# Patient Record
Sex: MALE | Race: WHITE | Employment: OTHER | URBAN - NONMETROPOLITAN AREA
[De-identification: names, ages, dates, MRNs, and addresses within clinical notes are randomized per-mention and may not be internally consistent; named-entity substitution may affect disease eponyms.]

---

## 2017-10-25 ENCOUNTER — HOSPITAL ENCOUNTER (INPATIENT)
Age: 72
LOS: 8 days | Discharge: NURSING, REHAB OR LONG-TERM CARE | DRG: 603 | End: 2017-11-02
Attending: EMERGENCY MEDICINE | Admitting: INTERNAL MEDICINE
Payer: MEDICARE

## 2017-10-25 ENCOUNTER — APPOINTMENT (OUTPATIENT)
Dept: GENERAL RADIOLOGY | Age: 72
DRG: 603 | End: 2017-10-25
Payer: MEDICARE

## 2017-10-25 ENCOUNTER — APPOINTMENT (OUTPATIENT)
Dept: CT IMAGING | Age: 72
DRG: 603 | End: 2017-10-25
Payer: MEDICARE

## 2017-10-25 DIAGNOSIS — L03.116 CELLULITIS OF LEFT LOWER EXTREMITY: Primary | ICD-10-CM

## 2017-10-25 DIAGNOSIS — A41.9 SEPSIS, DUE TO UNSPECIFIED ORGANISM: ICD-10-CM

## 2017-10-25 DIAGNOSIS — S20.20XA CONTUSION OF THORACIC WALL, UNSPECIFIED AREA OF THORACIC WALL, INITIAL ENCOUNTER: ICD-10-CM

## 2017-10-25 DIAGNOSIS — E66.01 MORBID OBESITY (HCC): ICD-10-CM

## 2017-10-25 DIAGNOSIS — E87.6 HYPOKALEMIA: ICD-10-CM

## 2017-10-25 DIAGNOSIS — M79.605 LEFT LEG PAIN: ICD-10-CM

## 2017-10-25 PROBLEM — I89.0 LYMPHEDEMA OF BOTH LOWER EXTREMITIES: Status: ACTIVE | Noted: 2017-10-25

## 2017-10-25 PROBLEM — E83.39 HYPOPHOSPHATEMIA: Status: ACTIVE | Noted: 2017-10-25

## 2017-10-25 PROBLEM — G40.909 SEIZURE DISORDER (HCC): Status: ACTIVE | Noted: 2017-10-25

## 2017-10-25 LAB
ALBUMIN SERPL-MCNC: 2.7 G/DL (ref 3.5–5.2)
ALP BLD-CCNC: 89 U/L (ref 40–130)
ALT SERPL-CCNC: 33 U/L (ref 5–41)
ANION GAP SERPL CALCULATED.3IONS-SCNC: 7 MMOL/L (ref 7–19)
AST SERPL-CCNC: 48 U/L (ref 5–40)
BACTERIA: NEGATIVE /HPF
BASOPHILS ABSOLUTE: 0.1 K/UL (ref 0–0.2)
BASOPHILS RELATIVE PERCENT: 0.4 % (ref 0–1)
BILIRUB SERPL-MCNC: 1.2 MG/DL (ref 0.2–1.2)
BILIRUBIN URINE: ABNORMAL
BLOOD, URINE: NEGATIVE
BUN BLDV-MCNC: 16 MG/DL (ref 8–23)
CALCIUM SERPL-MCNC: 8.9 MG/DL (ref 8.8–10.2)
CHLORIDE BLD-SCNC: 90 MMOL/L (ref 98–111)
CLARITY: ABNORMAL
CO2: 42 MMOL/L (ref 22–29)
COLOR: ABNORMAL
CREAT SERPL-MCNC: 0.8 MG/DL (ref 0.5–1.2)
EKG P AXIS: NORMAL DEGREES
EKG P-R INTERVAL: NORMAL MS
EKG Q-T INTERVAL: 366 MS
EKG QRS DURATION: 92 MS
EKG QTC CALCULATION (BAZETT): 413 MS
EKG T AXIS: 59 DEGREES
EOSINOPHILS ABSOLUTE: 0.3 K/UL (ref 0–0.6)
EOSINOPHILS RELATIVE PERCENT: 1.2 % (ref 0–5)
EPITHELIAL CELLS, UA: 3 /HPF (ref 0–5)
GFR NON-AFRICAN AMERICAN: >60
GLUCOSE BLD-MCNC: 134 MG/DL (ref 74–109)
GLUCOSE URINE: NEGATIVE MG/DL
HCT VFR BLD CALC: 43.7 % (ref 42–52)
HEMOGLOBIN: 14 G/DL (ref 14–18)
HYALINE CASTS: 18 /HPF (ref 0–8)
INR BLD: 1.33 (ref 0.88–1.18)
KETONES, URINE: ABNORMAL MG/DL
LACTIC ACID: 1.8 MMOL/L (ref 0.5–1.9)
LACTIC ACID: 2.3 MMOL/L (ref 0.5–1.9)
LEUKOCYTE ESTERASE, URINE: ABNORMAL
LV EF: 58 %
LVEF MODALITY: NORMAL
LYMPHOCYTES ABSOLUTE: 2.1 K/UL (ref 1.1–4.5)
LYMPHOCYTES RELATIVE PERCENT: 9.9 % (ref 20–40)
MAGNESIUM: 2.1 MG/DL (ref 1.6–2.4)
MCH RBC QN AUTO: 31.3 PG (ref 27–31)
MCHC RBC AUTO-ENTMCNC: 32 G/DL (ref 33–37)
MCV RBC AUTO: 97.8 FL (ref 80–94)
MONOCYTES ABSOLUTE: 1.2 K/UL (ref 0–0.9)
MONOCYTES RELATIVE PERCENT: 5.6 % (ref 0–10)
NEUTROPHILS ABSOLUTE: 17.2 K/UL (ref 1.5–7.5)
NEUTROPHILS RELATIVE PERCENT: 80.7 % (ref 50–65)
NITRITE, URINE: NEGATIVE
PDW BLD-RTO: 14.1 % (ref 11.5–14.5)
PERFORMED ON: NORMAL
PH UA: 6
PHENYTOIN LEVEL: 7.8 UG/ML (ref 10–20)
PHOSPHORUS: 1.7 MG/DL (ref 2.5–4.5)
PLATELET # BLD: 348 K/UL (ref 130–400)
PMV BLD AUTO: 9.6 FL (ref 9.4–12.4)
POC TROPONIN I: 0 NG/ML (ref 0–0.08)
POTASSIUM SERPL-SCNC: 3.1 MMOL/L (ref 3.5–5)
PRO-BNP: 513 PG/ML (ref 0–900)
PROTEIN UA: 30 MG/DL
PROTHROMBIN TIME: 16.5 SEC (ref 12–14.6)
RBC # BLD: 4.47 M/UL (ref 4.7–6.1)
RBC UA: 4 /HPF (ref 0–4)
SODIUM BLD-SCNC: 139 MMOL/L (ref 136–145)
SPECIFIC GRAVITY UA: 1.02
TOTAL CK: 254 U/L (ref 39–308)
TOTAL PROTEIN: 8 G/DL (ref 6.6–8.7)
TSH SERPL DL<=0.05 MIU/L-ACNC: 1.83 UIU/ML (ref 0.27–4.2)
UROBILINOGEN, URINE: 4 E.U./DL
WBC # BLD: 21.2 K/UL (ref 4.8–10.8)
WBC UA: 4 /HPF (ref 0–5)

## 2017-10-25 PROCEDURE — 84100 ASSAY OF PHOSPHORUS: CPT

## 2017-10-25 PROCEDURE — 6370000000 HC RX 637 (ALT 250 FOR IP): Performed by: INTERNAL MEDICINE

## 2017-10-25 PROCEDURE — 6360000002 HC RX W HCPCS: Performed by: INTERNAL MEDICINE

## 2017-10-25 PROCEDURE — 2580000003 HC RX 258: Performed by: INTERNAL MEDICINE

## 2017-10-25 PROCEDURE — 83605 ASSAY OF LACTIC ACID: CPT

## 2017-10-25 PROCEDURE — 36415 COLL VENOUS BLD VENIPUNCTURE: CPT

## 2017-10-25 PROCEDURE — 6360000004 HC RX CONTRAST MEDICATION: Performed by: INTERNAL MEDICINE

## 2017-10-25 PROCEDURE — 84443 ASSAY THYROID STIM HORMONE: CPT

## 2017-10-25 PROCEDURE — 6360000002 HC RX W HCPCS: Performed by: HOSPITALIST

## 2017-10-25 PROCEDURE — 6370000000 HC RX 637 (ALT 250 FOR IP): Performed by: HOSPITALIST

## 2017-10-25 PROCEDURE — 80185 ASSAY OF PHENYTOIN TOTAL: CPT

## 2017-10-25 PROCEDURE — 85610 PROTHROMBIN TIME: CPT

## 2017-10-25 PROCEDURE — 87086 URINE CULTURE/COLONY COUNT: CPT

## 2017-10-25 PROCEDURE — 81001 URINALYSIS AUTO W/SCOPE: CPT

## 2017-10-25 PROCEDURE — 6370000000 HC RX 637 (ALT 250 FOR IP): Performed by: EMERGENCY MEDICINE

## 2017-10-25 PROCEDURE — 73590 X-RAY EXAM OF LOWER LEG: CPT

## 2017-10-25 PROCEDURE — 85025 COMPLETE CBC W/AUTO DIFF WBC: CPT

## 2017-10-25 PROCEDURE — 99285 EMERGENCY DEPT VISIT HI MDM: CPT

## 2017-10-25 PROCEDURE — 87205 SMEAR GRAM STAIN: CPT

## 2017-10-25 PROCEDURE — 6360000002 HC RX W HCPCS: Performed by: EMERGENCY MEDICINE

## 2017-10-25 PROCEDURE — 93971 EXTREMITY STUDY: CPT

## 2017-10-25 PROCEDURE — 82550 ASSAY OF CK (CPK): CPT

## 2017-10-25 PROCEDURE — 83735 ASSAY OF MAGNESIUM: CPT

## 2017-10-25 PROCEDURE — 99285 EMERGENCY DEPT VISIT HI MDM: CPT | Performed by: EMERGENCY MEDICINE

## 2017-10-25 PROCEDURE — 83880 ASSAY OF NATRIURETIC PEPTIDE: CPT

## 2017-10-25 PROCEDURE — 80053 COMPREHEN METABOLIC PANEL: CPT

## 2017-10-25 PROCEDURE — 1210000000 HC MED SURG R&B

## 2017-10-25 PROCEDURE — 2500000003 HC RX 250 WO HCPCS: Performed by: INTERNAL MEDICINE

## 2017-10-25 PROCEDURE — 87040 BLOOD CULTURE FOR BACTERIA: CPT

## 2017-10-25 PROCEDURE — 87070 CULTURE OTHR SPECIMN AEROBIC: CPT

## 2017-10-25 PROCEDURE — 71020 XR CHEST STANDARD TWO VW: CPT

## 2017-10-25 PROCEDURE — C8929 TTE W OR WO FOL WCON,DOPPLER: HCPCS

## 2017-10-25 PROCEDURE — 93005 ELECTROCARDIOGRAM TRACING: CPT

## 2017-10-25 PROCEDURE — 86403 PARTICLE AGGLUT ANTBDY SCRN: CPT

## 2017-10-25 PROCEDURE — 2580000003 HC RX 258: Performed by: EMERGENCY MEDICINE

## 2017-10-25 PROCEDURE — 99223 1ST HOSP IP/OBS HIGH 75: CPT | Performed by: INTERNAL MEDICINE

## 2017-10-25 PROCEDURE — 87185 SC STD ENZYME DETCJ PER NZM: CPT

## 2017-10-25 PROCEDURE — 84484 ASSAY OF TROPONIN QUANT: CPT

## 2017-10-25 RX ORDER — DOCUSATE SODIUM 100 MG/1
100 CAPSULE, LIQUID FILLED ORAL 2 TIMES DAILY
Status: DISCONTINUED | OUTPATIENT
Start: 2017-10-25 | End: 2017-11-02 | Stop reason: HOSPADM

## 2017-10-25 RX ORDER — ACETAMINOPHEN 325 MG/1
650 TABLET ORAL EVERY 4 HOURS PRN
Status: DISCONTINUED | OUTPATIENT
Start: 2017-10-25 | End: 2017-11-02 | Stop reason: HOSPADM

## 2017-10-25 RX ORDER — POTASSIUM CHLORIDE 750 MG/1
10 TABLET, EXTENDED RELEASE ORAL DAILY
COMMUNITY
End: 2018-02-19 | Stop reason: DRUGHIGH

## 2017-10-25 RX ORDER — ONDANSETRON 2 MG/ML
4 INJECTION INTRAMUSCULAR; INTRAVENOUS EVERY 6 HOURS PRN
Status: DISCONTINUED | OUTPATIENT
Start: 2017-10-25 | End: 2017-11-02 | Stop reason: HOSPADM

## 2017-10-25 RX ORDER — HYDROCHLOROTHIAZIDE 50 MG/1
50 TABLET ORAL DAILY
Status: ON HOLD | COMMUNITY
End: 2017-11-02 | Stop reason: HOSPADM

## 2017-10-25 RX ORDER — DIAZEPAM 10 MG/1
10 TABLET ORAL EVERY 12 HOURS PRN
Status: DISCONTINUED | OUTPATIENT
Start: 2017-10-25 | End: 2017-10-25

## 2017-10-25 RX ORDER — DIAZEPAM 10 MG/1
10 TABLET ORAL EVERY 12 HOURS PRN
Status: ON HOLD | COMMUNITY
End: 2017-11-02 | Stop reason: HOSPADM

## 2017-10-25 RX ORDER — POTASSIUM CHLORIDE 20 MEQ/1
40 TABLET, EXTENDED RELEASE ORAL ONCE
Status: COMPLETED | OUTPATIENT
Start: 2017-10-25 | End: 2017-10-25

## 2017-10-25 RX ORDER — MORPHINE SULFATE 4 MG/ML
4 INJECTION, SOLUTION INTRAMUSCULAR; INTRAVENOUS ONCE
Status: COMPLETED | OUTPATIENT
Start: 2017-10-25 | End: 2017-10-25

## 2017-10-25 RX ORDER — METHYLPREDNISOLONE SODIUM SUCCINATE 40 MG/ML
40 INJECTION, POWDER, LYOPHILIZED, FOR SOLUTION INTRAMUSCULAR; INTRAVENOUS ONCE
Status: COMPLETED | OUTPATIENT
Start: 2017-10-25 | End: 2017-10-25

## 2017-10-25 RX ORDER — PHENYTOIN SODIUM 100 MG/1
200 CAPSULE, EXTENDED RELEASE ORAL 3 TIMES DAILY
Status: DISCONTINUED | OUTPATIENT
Start: 2017-10-25 | End: 2017-11-02 | Stop reason: HOSPADM

## 2017-10-25 RX ORDER — SODIUM CHLORIDE 9 MG/ML
INJECTION, SOLUTION INTRAVENOUS CONTINUOUS
Status: DISCONTINUED | OUTPATIENT
Start: 2017-10-25 | End: 2017-10-25

## 2017-10-25 RX ORDER — SODIUM CHLORIDE 0.9 % (FLUSH) 0.9 %
10 SYRINGE (ML) INJECTION PRN
Status: ACTIVE | OUTPATIENT
Start: 2017-10-25 | End: 2017-10-26

## 2017-10-25 RX ORDER — DIAZEPAM 5 MG/1
5 TABLET ORAL EVERY 12 HOURS PRN
Status: DISCONTINUED | OUTPATIENT
Start: 2017-10-25 | End: 2017-11-02 | Stop reason: HOSPADM

## 2017-10-25 RX ORDER — 0.9 % SODIUM CHLORIDE 0.9 %
1000 INTRAVENOUS SOLUTION INTRAVENOUS ONCE
Status: COMPLETED | OUTPATIENT
Start: 2017-10-25 | End: 2017-10-25

## 2017-10-25 RX ORDER — METOPROLOL TARTRATE 50 MG/1
50 TABLET, FILM COATED ORAL 2 TIMES DAILY
Status: DISCONTINUED | OUTPATIENT
Start: 2017-10-25 | End: 2017-10-26

## 2017-10-25 RX ORDER — DABIGATRAN ETEXILATE 150 MG/1
150 CAPSULE, COATED PELLETS ORAL 2 TIMES DAILY
Status: DISCONTINUED | OUTPATIENT
Start: 2017-10-25 | End: 2017-11-02 | Stop reason: HOSPADM

## 2017-10-25 RX ORDER — FENTANYL CITRATE 50 UG/ML
25 INJECTION, SOLUTION INTRAMUSCULAR; INTRAVENOUS EVERY 4 HOURS PRN
Status: COMPLETED | OUTPATIENT
Start: 2017-10-25 | End: 2017-10-27

## 2017-10-25 RX ORDER — BISACODYL 10 MG
10 SUPPOSITORY, RECTAL RECTAL DAILY PRN
Status: DISCONTINUED | OUTPATIENT
Start: 2017-10-25 | End: 2017-11-02 | Stop reason: HOSPADM

## 2017-10-25 RX ADMIN — TAZOBACTAM SODIUM AND PIPERACILLIN SODIUM 3.38 G: 375; 3 INJECTION, SOLUTION INTRAVENOUS at 13:56

## 2017-10-25 RX ADMIN — METOPROLOL TARTRATE 25 MG: 25 TABLET ORAL at 11:19

## 2017-10-25 RX ADMIN — SODIUM CHLORIDE 1000 ML: 9 INJECTION, SOLUTION INTRAVENOUS at 01:59

## 2017-10-25 RX ADMIN — PHENYTOIN SODIUM 200 MG: 100 CAPSULE ORAL at 21:17

## 2017-10-25 RX ADMIN — SODIUM CHLORIDE: 9 INJECTION, SOLUTION INTRAVENOUS at 04:45

## 2017-10-25 RX ADMIN — POTASSIUM PHOSPHATE, MONOBASIC AND POTASSIUM PHOSPHATE, DIBASIC 30 MMOL: 224; 236 INJECTION, SOLUTION, CONCENTRATE INTRAVENOUS at 04:44

## 2017-10-25 RX ADMIN — MORPHINE SULFATE 4 MG: 4 INJECTION, SOLUTION INTRAMUSCULAR; INTRAVENOUS at 02:24

## 2017-10-25 RX ADMIN — DABIGATRAN ETEXILATE MESYLATE 150 MG: 150 CAPSULE ORAL at 21:16

## 2017-10-25 RX ADMIN — TAZOBACTAM SODIUM AND PIPERACILLIN SODIUM 3.38 G: 375; 3 INJECTION, SOLUTION INTRAVENOUS at 06:17

## 2017-10-25 RX ADMIN — METHYLPREDNISOLONE SODIUM SUCCINATE 40 MG: 40 INJECTION, POWDER, FOR SOLUTION INTRAMUSCULAR; INTRAVENOUS at 13:56

## 2017-10-25 RX ADMIN — Medication 10 ML: at 14:30

## 2017-10-25 RX ADMIN — PERFLUTREN 2.64 MG: 6.52 INJECTION, SUSPENSION INTRAVENOUS at 14:30

## 2017-10-25 RX ADMIN — METOPROLOL TARTRATE 50 MG: 50 TABLET ORAL at 21:16

## 2017-10-25 RX ADMIN — Medication 2000 MG: at 02:35

## 2017-10-25 RX ADMIN — TAZOBACTAM SODIUM AND PIPERACILLIN SODIUM 3.38 G: 375; 3 INJECTION, SOLUTION INTRAVENOUS at 18:44

## 2017-10-25 RX ADMIN — CEFEPIME HYDROCHLORIDE 2 G: 2 INJECTION, SOLUTION INTRAVENOUS at 02:00

## 2017-10-25 RX ADMIN — TAZOBACTAM SODIUM AND PIPERACILLIN SODIUM 3.38 G: 375; 3 INJECTION, SOLUTION INTRAVENOUS at 23:56

## 2017-10-25 RX ADMIN — VANCOMYCIN HYDROCHLORIDE 1750 MG: 1 INJECTION, POWDER, LYOPHILIZED, FOR SOLUTION INTRAVENOUS at 15:39

## 2017-10-25 RX ADMIN — ENOXAPARIN SODIUM 40 MG: 40 INJECTION SUBCUTANEOUS at 11:19

## 2017-10-25 RX ADMIN — DOCUSATE SODIUM 100 MG: 100 CAPSULE, LIQUID FILLED ORAL at 11:20

## 2017-10-25 RX ADMIN — DOCUSATE SODIUM 100 MG: 100 CAPSULE, LIQUID FILLED ORAL at 21:16

## 2017-10-25 RX ADMIN — Medication 10 ML: at 21:17

## 2017-10-25 RX ADMIN — POTASSIUM CHLORIDE 40 MEQ: 20 TABLET, EXTENDED RELEASE ORAL at 02:24

## 2017-10-25 ASSESSMENT — ENCOUNTER SYMPTOMS
ROS SKIN COMMENTS: REDNESS LLE
NAUSEA: 0
HEARTBURN: 0
ORTHOPNEA: 0
SHORTNESS OF BREATH: 0
COUGH: 0
DOUBLE VISION: 0
COUGH: 1
BACK PAIN: 0
HEMOPTYSIS: 1
VOMITING: 0
BLURRED VISION: 0
ABDOMINAL PAIN: 0
PHOTOPHOBIA: 0

## 2017-10-25 ASSESSMENT — PAIN SCALES - GENERAL
PAINLEVEL_OUTOF10: 10
PAINLEVEL_OUTOF10: 10

## 2017-10-25 NOTE — PROGRESS NOTES
Nutrition Assessment    Type and Reason for Visit: Initial, Positive Nutrition Screen    Nutrition Recommendations: start Ensure Hi Pro    Malnutrition Assessment:  · Malnutrition Status: No malnutrition  · Context: Acute illness or injury    Nutrition Diagnosis:   · Problem: Increased nutrient needs  · Etiology: related to Increased demand for energy/nutrients due to     Signs and symptoms:  as evidenced by Presence of wounds    Nutrition Assessment:  · Subjective Assessment: +NS for wound, pt has open wound to LLE. Reports appetite is okay, just doesn't eat as much as he used to. · Nutrition-Focused Physical Findings:    · Wound Type: Open Wounds (cellulitis LLE)  · Current Nutrition Therapies:  · Oral Diet Orders: Cardiac   · Oral Diet intake: Unable to assess  · Anthropometric Measures:  · Ht: 5' 11\" (180.3 cm)   · Current Body Wt:    · Admission Body Wt: 455 lb (206.4 kg)  · Usual Body Wt:    · % Weight Change:  ,     · Ideal Body Wt: 172 lb (78 kg), % Ideal Body    · BMI Classification: BMI > or equal to 40.0 Obese Class III    Estimated Intake vs Estimated Needs: Insufficient Data    Nutrition Risk Level: Moderate    Nutrition Interventions:   Continue current diet, Start ONS  Continued Inpatient Monitoring    Nutrition Evaluation:   · Evaluation: Goals set   · Goals: po 75% or more, wound improvement    · Monitoring: Meal Intake, Supplement Intake, Skin Integrity, Wound Healing, Pertinent Labs, Weight    See Adult Nutrition Doc Flowsheet for more detail.      Electronically signed by Luis Fernando Woodall MS, RD, LD on 10/25/17 at 2:58 PM    Contact Number: [phone]

## 2017-10-25 NOTE — ED NOTES
Assessment:    Pt respirations even and unlabored, labored with exertion, skin color pale. Skin is warm and dry. Capillary refill less than 2 seconds. Congested cough noted. Bowel sounds within normal limits. Abdomen soft and nontender. Alert and oriented x 4. Pt is in no acute distress. Severe edema noted to extremities, worse on left. Left leg red from ankle up to lateral thigh. Large oozing wound to left posterior calf and large oozing wound to anterior leg.      Large hematoma noted to left breast.        Hilda Sales RN  10/25/17 [id number]

## 2017-10-25 NOTE — H&P
Hospital Medicine    History & Physical      CC; Pain and redness LLE, sp fall inability to walk    History Obtained From:  patient, electronic medical record    HISTORY OF PRESENT ILLNESS:    The patient is a 81 y.o. male who presents to ed c/o LLE swelling and redness, he did fall at his hotel and could not get up for 2 days, he was found to have cellulitis of entire LLE. He c/o fever, chills , wbc elevated. In er abx started    Past Medical History:        Diagnosis Date    Anxiety     Cerebral hemorrhage [address] [address]     Hypertension        Past Surgical History:        Procedure Laterality Date    TONSILLECTOMY         Medications Prior to Admission:    Prescriptions Prior to Admission: Phenytoin (DILANTIN PO), Take 700 mg by mouth nightly  hydrochlorothiazide (HYDRODIURIL) 50 MG tablet, Take 50 mg by mouth daily  potassium chloride (KLOR-CON M) 10 MEQ extended release tablet, Take 10 mEq by mouth daily  diazepam (VALIUM) 10 MG tablet, Take 10 mg by mouth every 12 hours as needed for Anxiety    Allergies:  Sulfa antibiotics    Social History:   TOBACCO:   reports that he has never smoked. He has never used smokeless tobacco.  ETOH:   reports that he drinks alcohol. Patient currently lives with family wife    Family History:   History reviewed. No pertinent family history. I have personally reviewed above histories  REVIEW OF SYSTEMS:  Review of Systems   Constitutional: Positive for chills and fever. HENT: Negative for ear discharge, ear pain, hearing loss and tinnitus. Eyes: Negative for blurred vision, double vision and photophobia. Respiratory: Positive for hemoptysis. Negative for cough. Cardiovascular: Positive for leg swelling. Negative for chest pain, palpitations, orthopnea, claudication and PND. Gastrointestinal: Negative for heartburn, nausea and vomiting. Musculoskeletal: Positive for joint pain and myalgias. Skin:        Redness LLE   Neurological: Positive for weakness.  Negative RBC 4.47 (*)     MCV 97.8 (*)     MCH 31.3 (*)     MCHC 32.0 (*)     Neutrophils % 80.7 (*)     Lymphocytes % 9.9 (*)     Neutrophils # 17.2 (*)     Monocytes # 1.20 (*)     All other components within normal limits   COMPREHENSIVE METABOLIC PANEL - Abnormal; Notable for the following:     Potassium 3.1 (*)     Chloride 90 (*)     CO2 42 (*)     Glucose 134 (*)     Alb 2.7 (*)     AST 48 (*)     All other components within normal limits    Narrative:     CALL  [employer] tel. ,  Chemistry results called to and read back by Javier Alvarado RN IN ER, 10/25/2017  02:00, by CATARINO   LACTIC ACID, PLASMA - Abnormal; Notable for the following:     Lactic Acid 2.3 (*)     All other components within normal limits    Narrative:     CALL  [employer] tel. ,  Chemistry results called to and read back by Javier Alvarado RN IN ER, 10/25/2017  01:13, by Jorge Swanson   PROTIME-INR - Abnormal; Notable for the following:     Protime 16.5 (*)     INR 1.33 (*)     All other components within normal limits   URINALYSIS - Abnormal; Notable for the following:     Color, UA ORANGE (*)     Clarity, UA CLOUDY (*)     Bilirubin Urine MODERATE (*)     Ketones, Urine TRACE (*)     Protein, UA 30 (*)     Urobilinogen, Urine 4.0 (*)     Leukocyte Esterase, Urine TRACE (*)     All other components within normal limits   PHENYTOIN LEVEL, TOTAL - Abnormal; Notable for the following:     Phenytoin Lvl 7.8 (*)     All other components within normal limits   MICROSCOPIC URINALYSIS - Abnormal; Notable for the following:     Hyaline Casts, UA 18 (*)     All other components within normal limits   PHOSPHORUS - Abnormal; Notable for the following:     Phosphorus 1.7 (*)     All other components within normal limits   CULTURE BLOOD #1   CULTURE BLOOD #2   URINE CULTURE   CK    Narrative:     CALL  [employer] tel. ,  Chemistry results called to and read back by Javier Alvarado RN IN ER, 10/25/2017  02:00, by CATARINO   LACTIC ACID, PLASMA   MAGNESIUM   POCT VENOUS

## 2017-10-25 NOTE — ED PROVIDER NOTES
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 After shower on weds morning he sat on the bed and fell through it ( approx 500 lbs ). Hit his knee on wall then. States leg was already a little red but now is severely swollen and inflamed. Pt has [place] on the [place] of [place] [facility] [place] since [place], he called his family and they drove up there and got him, driving straight here. HISTORY OF PRESENT ILLNESS   (Location/Symptom, Timing/Onset, Context/Setting, Quality, Duration, Modifying Factors, Severity)  Note limiting factors. Rima Lubin is a 81 y.o. male who presents to the emergency department With left lower extremity cellulitis and redness. The patient also describes having fevers on and off times last week. The patient is a very pleasant man who lives in [place]. He was traveling from [place]. He drove to the ER as he has not been doing well the last week. He is morbidly obese about 400-500 pounds. Exact weight unknown. On arrival to the ER he was unable to get out of his truck. We had to go out to the parking lot nervous system. The patient was found to have cellulitis of the entire left lower extremity. It is red and hot to the touch. The right one just has chronic skin changes. He states that he fell out of bed or basically slipped out of the bed onto the floor on Wednesday in [place] he was stuck on the floor until Friday evening. He crawled around on the floor and bruised his left pectoralis as well as his left leg. He was rubbing it on the floor and so he ended up getting cellulitis of the left lower extremity. He denies chest pain shortness of breath or abdominal pain. He has no fevers. He states occasionally he does get the cellulitis of the left large family. The patient is currently not on any antibiotics. He has had subjective fevers and chills for the last week. The patient otherwise is rather healthy and denies any history of diabetes heart disease or lung disease. [facility]    Nursing Notes were reviewed. REVIEW OF SYSTEMS    (2-9 systems for level 4, 10 or more for level 5)     Review of Systems   Constitutional: Positive for fever. Respiratory: Positive for cough. Negative for shortness of breath. Cardiovascular: Negative for chest pain. Gastrointestinal: Negative for abdominal pain and vomiting. Musculoskeletal: Negative for back pain. Skin: Positive for rash and wound. Neurological: Negative for syncope and headaches. Psychiatric/Behavioral: Negative for confusion. A complete review of systems was performed and is negative except as noted above in the HPI. PAST MEDICAL HISTORY     Past Medical History:   Diagnosis Date    Anxiety     [facility]bral hemorrhage ([facility] [address]     Hypertension          SURGICAL HISTORY       Past Surgical History:   Procedure Laterality Date    TONSILLECTOMY           CURRENT MEDICATIONS       Previous Medications    DIAZEPAM (VALIUM) 10 MG TABLET    Take 10 mg by mouth every 12 hours as needed for Anxiety    HYDROCHLOROTHIAZIDE (HYDRODIURIL) 50 MG TABLET    Take 50 mg by mouth daily    PHENYTOIN (DILANTIN PO)    Take 700 mg by mouth nightly    POTASSIUM CHLORIDE (KLOR-CON M) 10 MEQ EXTENDED RELEASE TABLET    Take 10 mEq by mouth daily       ALLERGIES     Sulfa antibiotics    FAMILY HISTORY     History reviewed. No pertinent family history.        SOCIAL HISTORY       Social History     Social History    Marital status:      Spouse name: N/A    Number of children: N/A    Years of education: N/A     Social History Main Topics    Smoking status: Never Smoker    Smokeless tobacco: Never Used    Alcohol use Yes Comment: on occasion    Drug use: No    Sexual activity: Not Asked     Other Topics Concern    None     Social History Narrative    None       SCREENINGS             PHYSICAL EXAM    (up to 7 for level 4, 8 or more for level 5)     ED Triage Vitals [10/25/17 0038]   BP Temp Temp Source Pulse Resp SpO2 Height Weight   (!) 129/92 98.1 °F (36.7 °C) Oral 112 20 91 % 5' 11\" (1.803 m) (!) 400 lb (181.4 kg)       Physical Exam   Constitutional: He is oriented to person, place, and time. He appears well-developed and well-nourished. No distress. Morbid the obese sitting up in bed no acute distress very pleasant gentleman   HENT:   Head: Normocephalic and atraumatic. Eyes: Pupils are equal, round, and reactive to light. Neck: Normal range of motion. Neck supple. Cardiovascular: Regular rhythm and normal heart sounds. Heart rate 112   Pulmonary/Chest: Effort normal and breath sounds normal. No respiratory distress. Abdominal: Soft. He exhibits no distension. There is no tenderness. There is no rebound. Musculoskeletal:   Massive lymphedema and edema of the left lower extremity compared to the right the right has lymphedema and chronic skin changes. There is obvious cellulitis of the left lower extremity and encompassing the entire lower leg below the knee    The patient has bruising of the left pectoralis laterally. There is a hematoma and/or contusion. The patient has intertriginous rash of the groin. Neurological: He is alert and oriented to person, place, and time. Skin: He is not diaphoretic. Psychiatric: He has a normal mood and affect. His behavior is normal.   Nursing note and vitals reviewed. DIAGNOSTIC RESULTS     EKG: All EKG's are interpreted by the Emergency Department Physician who either signs or Co-signs this chart in the absence of a cardiologist.    EKG shows possible A.  Fib rate 89    RADIOLOGY:   Non-plain film images such as CT, Ultrasound and MRI are read by the marine. Lorene Goodpasture radiographic images are visualized and preliminarily interpreted by the emergency physician with the below findings:      Interpretation per the Radiologist below, if available at the time of this note:    XR CHEST STANDARD (2 VW)    (Results Pending)   XR TIBIA FIBULA LEFT (2 VIEWS)    (Results Pending)         ED BEDSIDE ULTRASOUND:   Performed by ED Physician - none    LABS:  Labs Reviewed   CBC WITH AUTO DIFFERENTIAL - Abnormal; Notable for the following:        Result Value    WBC 21.2 (*)     RBC 4.47 (*)     MCV 97.8 (*)     MCH 31.3 (*)     MCHC 32.0 (*)     Neutrophils % 80.7 (*)     Lymphocytes % 9.9 (*)     Neutrophils # 17.2 (*)     Monocytes # 1.20 (*)     All other components within normal limits   COMPREHENSIVE METABOLIC PANEL - Abnormal; Notable for the following:     Potassium 3.1 (*)     Chloride 90 (*)     CO2 42 (*)     Glucose 134 (*)     Alb 2.7 (*)     AST 48 (*)     All other components within normal limits    Narrative:     CALL  [place]  [employer] tel. ,  Chemistry results called to and read back by Felix Jesus ER, 10/25/2017  02:00, by CATARINO   LACTIC ACID, PLASMA - Abnormal; Notable for the following:     Lactic Acid 2.3 (*)     All other components within normal limits    Narrative:     CALL  [place]  [employer] tel. ,  Chemistry results called to and read back by Albania Mace RN IN ER, 10/25/2017  01:13, by Steve Arce   PROTIME-INR - Abnormal; Notable for the following:     Protime 16.5 (*)     INR 1.33 (*)     All other components within normal limits   URINALYSIS - Abnormal; Notable for the following:     Color, UA ORANGE (*)     Clarity, UA CLOUDY (*)     Bilirubin Urine MODERATE (*)     Ketones, Urine TRACE (*)     Protein, UA 30 (*)     Urobilinogen, Urine 4.0 (*)     Leukocyte Esterase, Urine TRACE (*)     All other components within normal limits   PHENYTOIN LEVEL, TOTAL - Abnormal; Notable for the following:     Phenytoin Lvl 7.8 (*)     All other components within normal limits

## 2017-10-25 NOTE — CONSULTS
[facility]      Cardiology Consultation      Date of Admission:  10/25/2017 12:35 AM    Date of Initially Being Seen / Consultation:  10/25/17    Cardiologist:  Dr Marta Davies   Cardiology Attending: Dr Pedro Borges Attending: Dr Jas Lee     PCP:  No primary care provider on file. Reason for Consultation or Admission / Chief Complaint:  Newly Discovered AFIB    SUBJECTIVE AND HISTORY OF PRESENT ILLNESS:    Source of the history:  Patient, family, previous inpatient and outpatient records in EPIC. Jignesh De Leon is a 81 y.o. male who presents to [facility] ER with symptoms / signs / problem or diagnosis of left lower extremity swelling. Patient was working in [place] staying at a hotel when he slid out of bed onto the floor of his hotel room. Stayed on floor for about 3 days until [facility] realized he had not seen the patient in a couple days.  knocked on his hotel room door and patient was able to yell for help. EMS was summoned and patient states he was checked out and was asked if he wanted treatment there in [place]. Patient declined, called his wife who drove immediately to him and brought him back here to [place] and presented to the ER at [facility]. Patient denies any chest pain, pressure or tightness. No shortness of breath. Cardiology was asked to see patient for new onset AFIB per EKG. Patient denies any prior history of AFIB. Patient denies any prior history of CAD, CHF or MI.      Family present:  no      CARDIAC RISK PROFILE:    Risk Factor Yes / No / Unknown       Gender Male   Cigarette Use No   Family History of Cardiovascular Disease No   Diabetes Mellitus no   Hypercholesteremia no   Hypertension No          Cardiac Specific Problems:    Specialty Problems     None            PRIOR CARDIAC PROBLEM LIST  (IF APPLICABLE):          Past Medical History:    Past Medical History:   Diagnosis Date    Anxiety     Cerebral 2. Hypertension Initial presentation during this evaluation   Review and summation of old records:    -135; Diastolic ; Current blood pressure 132/70   No Continue current medications:    yes           3. HUMBERTO- Cellulitis  Initial presentation during this evaluation Left leg venous scan: There is no evidence of deep venous thrombosis (DVT) in the left lower extremity(ies). Lizbeth Lemming is no evidence of superficial thrombophlebitis of the left lower  extremity(ies).   The exam is technically limited due to edema and body habitus ; therefore  the left distal femoral, and tibial veins were not well visualized. Tibia/Fibula Xray:    No acute bony abnormality. Marked soft tissue swelling of the lower leg. WBC 21.2; Blood cultures pending. On Zosyn, Vancomycin. No Continue current medications:       yes     4. Hypokalemia - 3.1; Followed by Hospitalist.     PLAN:    1. Continue present medications except for changes as noted above  2. Continue to monitor rhythm  3. Further orders per clinical course. 4. 2D Echo. Discussed with patient and nursing. I greatly appreciate the opportunity and your confidence in allowing me to participate in the care of Sandeep Ramirez    Electronically signed by Derick Valdivia NP on 10/25/17     [address] [place] [address]    I have reviewed and discussed Mr. Sandeep Ramirez with the above Nurse practitioner. I have seen and examined patient and agree with above assessment and plan. Subjective: patient down on the floor for approximately 72 hours and developed a painful swollen left lower extremity. No unusual dyspnea or chest pain. Objective: atrial fibrillation. Rate is controlled. Echo fairly unremarkable all things considered except that his PA pressure is estimated to be 57 mmHg. Plan: start Pradaxa as anticoagulation for his atrial fibrillation of uncertain duration.  All the other new new or anticoagulation agents can

## 2017-10-25 NOTE — PROGRESS NOTES
Pharmacy Note  Vancomycin Consult    Savana Mo is a 81 y.o. male started on Vancomycin for Cellulitis; consult received from Dr. Chirag Dubon to manage therapy. Also receiving the following antibiotics: Zosyn. There is no problem list on file for this patient. Allergies:  Sulfa antibiotics     Temp max: 98.9    Recent Labs      10/25/17   0115   BUN  16       Recent Labs      10/25/17   0115   CREATININE  0.8       Recent Labs      10/25/17   0058   WBC  21.2*         Intake/Output Summary (Last 24 hours) at 10/25/17 0407  Last data filed at 10/25/17 0115   Gross per 24 hour   Intake 0 ml   Output 450 ml   Net -450 ml     No current cultures at this time  Culture Date Source Results                      Ht Readings from Last 1 Encounters:   10/25/17 5' 11\" (1.803 m)        Wt Readings from Last 1 Encounters:   10/25/17 (!) 455 lb 1 oz (206.4 kg)         Body mass index is 63.47 kg/m². Estimated Creatinine Clearance: 155 mL/min (based on SCr of 0.8 mg/dL). Assessment/Plan:  Will initiate vancomycin 1750 mg IV every 12 hours. Timing of trough level will be determined based on culture results, renal function, and clinical response. Thank you for the consult. Will continue to follow.     Electronically signed by STEFAN [facility] on 10/25/2017 at 4:07 AM

## 2017-10-25 NOTE — PROGRESS NOTES
Patient leg is not draining to culture at this time, will continue to monitor and pass on that if it does start to drain that culture is needed.

## 2017-10-26 LAB
ANION GAP SERPL CALCULATED.3IONS-SCNC: 5 MMOL/L (ref 7–19)
BASOPHILS ABSOLUTE: 0.1 K/UL (ref 0–0.2)
BASOPHILS RELATIVE PERCENT: 0.5 % (ref 0–1)
BUN BLDV-MCNC: 12 MG/DL (ref 8–23)
CALCIUM SERPL-MCNC: 7.8 MG/DL (ref 8.8–10.2)
CHLORIDE BLD-SCNC: 95 MMOL/L (ref 98–111)
CO2: 39 MMOL/L (ref 22–29)
CREAT SERPL-MCNC: 0.6 MG/DL (ref 0.5–1.2)
EOSINOPHILS ABSOLUTE: 0.2 K/UL (ref 0–0.6)
EOSINOPHILS RELATIVE PERCENT: 1 % (ref 0–5)
GFR NON-AFRICAN AMERICAN: >60
GLUCOSE BLD-MCNC: 154 MG/DL (ref 74–109)
HCT VFR BLD CALC: 38.3 % (ref 42–52)
HEMOGLOBIN: 11.9 G/DL (ref 14–18)
LYMPHOCYTES ABSOLUTE: 1.9 K/UL (ref 1.1–4.5)
LYMPHOCYTES RELATIVE PERCENT: 9.7 % (ref 20–40)
MCH RBC QN AUTO: 31 PG (ref 27–31)
MCHC RBC AUTO-ENTMCNC: 31.1 G/DL (ref 33–37)
MCV RBC AUTO: 99.7 FL (ref 80–94)
MONOCYTES ABSOLUTE: 1 K/UL (ref 0–0.9)
MONOCYTES RELATIVE PERCENT: 5 % (ref 0–10)
NEUTROPHILS ABSOLUTE: 15.5 K/UL (ref 1.5–7.5)
NEUTROPHILS RELATIVE PERCENT: 81.4 % (ref 50–65)
PDW BLD-RTO: 14.6 % (ref 11.5–14.5)
PLATELET # BLD: 304 K/UL (ref 130–400)
PMV BLD AUTO: 9.4 FL (ref 9.4–12.4)
POTASSIUM SERPL-SCNC: 2.8 MMOL/L (ref 3.5–5)
RBC # BLD: 3.84 M/UL (ref 4.7–6.1)
SODIUM BLD-SCNC: 139 MMOL/L (ref 136–145)
VANCOMYCIN TROUGH: 9 UG/ML (ref 10–20)
WBC # BLD: 19.1 K/UL (ref 4.8–10.8)

## 2017-10-26 PROCEDURE — 99232 SBSQ HOSP IP/OBS MODERATE 35: CPT | Performed by: INTERNAL MEDICINE

## 2017-10-26 PROCEDURE — 36415 COLL VENOUS BLD VENIPUNCTURE: CPT

## 2017-10-26 PROCEDURE — 6370000000 HC RX 637 (ALT 250 FOR IP): Performed by: INTERNAL MEDICINE

## 2017-10-26 PROCEDURE — 6360000002 HC RX W HCPCS: Performed by: INTERNAL MEDICINE

## 2017-10-26 PROCEDURE — 6370000000 HC RX 637 (ALT 250 FOR IP): Performed by: HOSPITALIST

## 2017-10-26 PROCEDURE — 80048 BASIC METABOLIC PNL TOTAL CA: CPT

## 2017-10-26 PROCEDURE — 2580000003 HC RX 258: Performed by: INTERNAL MEDICINE

## 2017-10-26 PROCEDURE — 80202 ASSAY OF VANCOMYCIN: CPT

## 2017-10-26 PROCEDURE — 1210000000 HC MED SURG R&B

## 2017-10-26 PROCEDURE — 85025 COMPLETE CBC W/AUTO DIFF WBC: CPT

## 2017-10-26 RX ORDER — POTASSIUM CHLORIDE 20 MEQ/1
20 TABLET, EXTENDED RELEASE ORAL 2 TIMES DAILY
Status: DISCONTINUED | OUTPATIENT
Start: 2017-10-27 | End: 2017-11-02 | Stop reason: HOSPADM

## 2017-10-26 RX ORDER — BUMETANIDE 1 MG/1
1 TABLET ORAL DAILY
Status: DISCONTINUED | OUTPATIENT
Start: 2017-10-26 | End: 2017-10-27

## 2017-10-26 RX ORDER — POTASSIUM CHLORIDE 7.45 MG/ML
10 INJECTION INTRAVENOUS PRN
Status: DISCONTINUED | OUTPATIENT
Start: 2017-10-26 | End: 2017-11-02 | Stop reason: HOSPADM

## 2017-10-26 RX ORDER — POTASSIUM CHLORIDE 20 MEQ/1
40 TABLET, EXTENDED RELEASE ORAL
Status: COMPLETED | OUTPATIENT
Start: 2017-10-26 | End: 2017-10-26

## 2017-10-26 RX ADMIN — DABIGATRAN ETEXILATE MESYLATE 150 MG: 150 CAPSULE ORAL at 21:23

## 2017-10-26 RX ADMIN — PHENYTOIN SODIUM 200 MG: 100 CAPSULE ORAL at 21:23

## 2017-10-26 RX ADMIN — MEROPENEM 1 G: 1 INJECTION, POWDER, FOR SOLUTION INTRAVENOUS at 17:28

## 2017-10-26 RX ADMIN — PHENYTOIN SODIUM 200 MG: 100 CAPSULE ORAL at 07:50

## 2017-10-26 RX ADMIN — METOPROLOL TARTRATE 50 MG: 50 TABLET ORAL at 07:50

## 2017-10-26 RX ADMIN — POTASSIUM CHLORIDE 40 MEQ: 20 TABLET, EXTENDED RELEASE ORAL at 07:50

## 2017-10-26 RX ADMIN — TAZOBACTAM SODIUM AND PIPERACILLIN SODIUM 3.38 G: 375; 3 INJECTION, SOLUTION INTRAVENOUS at 12:16

## 2017-10-26 RX ADMIN — BUMETANIDE 1 MG: 1 TABLET ORAL at 17:28

## 2017-10-26 RX ADMIN — VANCOMYCIN HYDROCHLORIDE 1500 MG: 1 INJECTION, POWDER, LYOPHILIZED, FOR SOLUTION INTRAVENOUS at 18:34

## 2017-10-26 RX ADMIN — METOPROLOL TARTRATE 75 MG: 50 TABLET ORAL at 21:23

## 2017-10-26 RX ADMIN — DABIGATRAN ETEXILATE MESYLATE 150 MG: 150 CAPSULE ORAL at 07:50

## 2017-10-26 RX ADMIN — DOCUSATE SODIUM 100 MG: 100 CAPSULE, LIQUID FILLED ORAL at 07:50

## 2017-10-26 RX ADMIN — FENTANYL CITRATE 25 MCG: 50 INJECTION INTRAMUSCULAR; INTRAVENOUS at 20:36

## 2017-10-26 RX ADMIN — VANCOMYCIN HYDROCHLORIDE 1750 MG: 1 INJECTION, POWDER, LYOPHILIZED, FOR SOLUTION INTRAVENOUS at 02:38

## 2017-10-26 RX ADMIN — PHENYTOIN SODIUM 200 MG: 100 CAPSULE ORAL at 14:46

## 2017-10-26 RX ADMIN — TAZOBACTAM SODIUM AND PIPERACILLIN SODIUM 3.38 G: 375; 3 INJECTION, SOLUTION INTRAVENOUS at 06:09

## 2017-10-26 RX ADMIN — POTASSIUM CHLORIDE 40 MEQ: 20 TABLET, EXTENDED RELEASE ORAL at 06:09

## 2017-10-26 RX ADMIN — DOCUSATE SODIUM 100 MG: 100 CAPSULE, LIQUID FILLED ORAL at 21:23

## 2017-10-26 ASSESSMENT — PAIN SCALES - GENERAL
PAINLEVEL_OUTOF10: 0
PAINLEVEL_OUTOF10: 10

## 2017-10-26 NOTE — PROGRESS NOTES
[facility] MEDICINE  - PROGRESS NOTE    Admit Date: 10/25/2017       Chief Complaint:  A fib, cellulitis follow up      Subjective:   Feels a little better today. Thinks the swelling in his right leg and foot is mildly improved but redness is about the same. No chest pain or palpitations. No fevers or chills. Mild SOB, cough up large amount yellowish sputum yesterday and feels better. No other complaints today. ROS:  Pertinent items are noted in HPI. 14 point reveiw of systems otherwise negative. Data:   Scheduled Meds: Reviewed  Continuous Infusions:     Intake/Output Summary (Last 24 hours) at 10/26/17 1645  Last data filed at 10/26/17 1443   Gross per 24 hour   Intake          3271.27 ml   Output             1400 ml   Net          1871.27 ml     Recent Labs      10/25/17   0058  10/26/17   0347   WBC  21.2*  19.1*   HGB  14.0  11.9*   PLT  348  304     Recent Labs      10/25/17   0115  10/26/17   0347   NA  139  139   K  3.1*  2.8*   CL  90*  95*   CO2  42*  39*   BUN  16  12   CREATININE  0.8  0.6   GLUCOSE  134*  154*     Recent Labs      10/25/17   0115   AST  48*   ALT  33   BILITOT  1.2   ALKPHOS  89     Troponin T:   Recent Labs      10/25/17   0047   TROPONINI  0.00     Pro-BNP: No results for input(s): BNP in the last 72 hours.   INR:   Recent Labs      10/25/17   0058   INR  1.33*     LE dopplers:  Impression     Kevin Ped is no evidence of deep venous thrombosis (DVT) in the left lower   extremity(ies).   There is no evidence of superficial thrombophlebitis of the left lower   extremity(ies).   The exam is technically limited due to edema and body habitus ; therefore   the left distal femoral, and tibial veins were not well visualized.      Signature      ----------------------------------------------------------------   Electronically signed by Paradise Rivera MD      XR TIBIA FIBULA LEFT (2 VIEWS) [[id number]] Resulted: 10/25/17 5240   Order Status: Completed Updated: 10/25/17 0716     Narrative:       Examination. XR TIBIA FIBULA LEFT STANDARD  History: Cellulitis. The frontal and lateral views of the left tibia and fibula are  obtained. There is no previous study for comparison. This study is of very limited diagnostic value due to motion  artifacts. There is no evidence of recent fracture. No focal bony erosion. There is marked soft tissue swelling of the entire visualized lower  leg. The visualized knee joint and ankle show a mild chronic  osteoarthritis.     Impression:       No acute bony abnormality. Marked soft tissue swelling of the lower leg. Objective:   Vitals: /67   Pulse 92   Temp 98.6 °F (37 °C) (Temporal)   Resp 18   Ht 5' 11\" (1.803 m)   Wt (!) 451 lb 4 oz (204.7 kg)   SpO2 95%   BMI 62.94 kg/m²   General appearance: No apparent distress, appears stated age and cooperative. HEENT: Normal cephalic, atraumatic without obvious deformity. Pupils equal, round, and reactive to light. Extra ocular muscles intact. Conjunctivae/corneas clear. Neck: Supple, with full range of motion. No jugular venous distention. Trachea midline. No thyroid tenderness. No masses palpated. Respiratory:  Normal respiratory effort. Clear to auscultation, bilaterally without Rales/Wheezes/Rhonchi. Cardiovascular:  Irregular and tachycardic with normal S1/S2 without murmurs, rubs or gallops. Abdomen: Soft, non-tender, non-distended with normal bowel sounds. No hepatosplenomegaly. Morbidly obesity. Musculoskeletal: No clubbing, cyanosis. 2-3+ edema LLE, 4+ RLE with erythema and warmth. Skin: \"Wound:  LLE edematous and red, no open areas at this time, no drainage, unable to obtain wound culture at this time. Noted dry whitish cream color plaque area to anterior and posterior calf. RLE half the size of left, small reddish area RLE distal calf. Aright and left heels dry and scaly, intact.  Heels floating off the bed with pillows. Patient able to turn with minimal assist, back side is clear, buttocks and coccyx intact. Scrotum enlarged, groin and scrotal folds moist and reddened. \" per wound care nurse, unable to roll patient today. Neurologic:  Neurovascularly intact without any focal sensory/motor deficits. Cranial nerves: II-XII intact, grossly non-focal.  Psychiatric: Alert and oriented, thought content appropriate, normal insight      Assessment & Plan: Active Hospital Problems    Diagnosis Date Noted    Hypokalemia [E87.6]      Priority: High    Persistent atrial fibrillation (HCC) [I48.1]      Priority: High    Lymphedema of both lower extremities [I89.0] 10/25/2017    Mor[facility] ([facility] [E66.01] 10/25/2017    Cellulitis of left lower extremity [L03.116] 10/25/2017    Hypophosphatemia [E83.39] 10/25/2017    Seizure disorder ([facility] [H90.959] 10/25/2017     DC zosyn, start merrem. Continue vancomycin. Wound care. ACE wraps to bilateral LE. Bumex 0.5 mg daily. Replete potassium. Repeat labs in am.  Appreciate cardiology assistance which is following for a fib. Increase activity as tolerated. See all orders. Further recommendations per clinical course.         Chong Herman DO

## 2017-10-26 NOTE — PROGRESS NOTES
[id number] [facility]  Progress Note                            Date:  10/26/2017  Patient: Eugenia Staples  Admission:  10/25/2017 12:35 AM  Admit DX: Cellulitis [L03.90]  Age:  81 y.o., 1945     LOS: 1 day     Reason for evaluation:   atrial fibrillation      SUBJECTIVE:    The patient was seen and examined. Notes and labs reviewed. There Were no complications over night. Patient's cardiac review of systems: negative. The patient is generally feeling improved. Admitted with left lower extremity cellulitis and found to have atrial fibrillation of uncertain duration      OBJECTIVE:    Telemetry: atrial fibrillation  /67   Pulse 92   Temp 98.6 °F (37 °C) (Temporal)   Resp 18   Ht 5' 11\" (1.803 m)   Wt (!) 451 lb 4 oz (204.7 kg)   SpO2 95%   BMI 62.94 kg/m²     Intake/Output Summary (Last 24 hours) at 10/26/17 1302  Last data filed at 10/26/17 1233   Gross per 24 hour   Intake          3031.27 ml   Output             1400 ml   Net          1631.27 ml       Labs:   CBC:   Recent Labs      10/25/17   0058  10/26/17   0347   WBC  21.2*  19.1*   HGB  14.0  11.9*   HCT  43.7  38.3*   PLT  348  304     BMP: Recent Labs      10/25/17   0115  10/26/17   0347   NA  139  139   K  3.1*  2.8*   CO2  42*  39*   BUN  16  12   CREATININE  0.8  0.6   LABGLOM  >60  >60   GLUCOSE  134*  154*     BNP: No results for input(s): BNP in the last 72 hours. PT/INR:   Recent Labs      10/25/17   0058   PROTIME  16.5*   INR  1.33*     APTT:No results for input(s): APTT in the last 72 hours.   CARDIAC ENZYMES:  Recent Labs      10/25/17   0047  10/25/17   0115   CKTOTAL   --   254   TROPONINI  0.00   --      FASTING LIPID PANEL:No results found for: HDL, LDLDIRECT, LDLCALC, TRIG  LIVER PROFILE:  Recent Labs      10/25/17   0115   AST  48*   ALT  33   LABALBU  2.7*           PFSH:  No change    ROSS:  No change      Physical Examination:  /67   Pulse 92   Temp 98.6 °F (37 °C) (Temporal)   Resp 18

## 2017-10-27 LAB
URINE CULTURE, ROUTINE: NORMAL
VANCOMYCIN TROUGH: 17.9 UG/ML (ref 10–20)

## 2017-10-27 PROCEDURE — 36415 COLL VENOUS BLD VENIPUNCTURE: CPT

## 2017-10-27 PROCEDURE — 2580000003 HC RX 258: Performed by: INTERNAL MEDICINE

## 2017-10-27 PROCEDURE — 6370000000 HC RX 637 (ALT 250 FOR IP): Performed by: HOSPITALIST

## 2017-10-27 PROCEDURE — 6360000002 HC RX W HCPCS: Performed by: INTERNAL MEDICINE

## 2017-10-27 PROCEDURE — 99232 SBSQ HOSP IP/OBS MODERATE 35: CPT | Performed by: INTERNAL MEDICINE

## 2017-10-27 PROCEDURE — 6370000000 HC RX 637 (ALT 250 FOR IP): Performed by: INTERNAL MEDICINE

## 2017-10-27 PROCEDURE — 1210000000 HC MED SURG R&B

## 2017-10-27 PROCEDURE — 2500000003 HC RX 250 WO HCPCS: Performed by: INTERNAL MEDICINE

## 2017-10-27 PROCEDURE — 80202 ASSAY OF VANCOMYCIN: CPT

## 2017-10-27 RX ORDER — METOPROLOL TARTRATE 50 MG/1
100 TABLET, FILM COATED ORAL 2 TIMES DAILY
Status: DISCONTINUED | OUTPATIENT
Start: 2017-10-27 | End: 2017-11-02 | Stop reason: HOSPADM

## 2017-10-27 RX ORDER — BUMETANIDE 0.25 MG/ML
1 INJECTION, SOLUTION INTRAMUSCULAR; INTRAVENOUS 2 TIMES DAILY
Status: DISCONTINUED | OUTPATIENT
Start: 2017-10-27 | End: 2017-11-02 | Stop reason: HOSPADM

## 2017-10-27 RX ORDER — FINASTERIDE 5 MG/1
5 TABLET, FILM COATED ORAL DAILY
Status: DISCONTINUED | OUTPATIENT
Start: 2017-10-27 | End: 2017-11-02 | Stop reason: HOSPADM

## 2017-10-27 RX ADMIN — VANCOMYCIN HYDROCHLORIDE 1500 MG: 1 INJECTION, POWDER, LYOPHILIZED, FOR SOLUTION INTRAVENOUS at 10:30

## 2017-10-27 RX ADMIN — BUMETANIDE 1 MG: 0.25 INJECTION INTRAMUSCULAR; INTRAVENOUS at 16:04

## 2017-10-27 RX ADMIN — BUMETANIDE 1 MG: 1 TABLET ORAL at 09:02

## 2017-10-27 RX ADMIN — MEROPENEM 1 G: 1 INJECTION, POWDER, FOR SOLUTION INTRAVENOUS at 16:04

## 2017-10-27 RX ADMIN — PHENYTOIN SODIUM 200 MG: 100 CAPSULE ORAL at 20:59

## 2017-10-27 RX ADMIN — PHENYTOIN SODIUM 200 MG: 100 CAPSULE ORAL at 09:02

## 2017-10-27 RX ADMIN — MEROPENEM 1 G: 1 INJECTION, POWDER, FOR SOLUTION INTRAVENOUS at 09:02

## 2017-10-27 RX ADMIN — VANCOMYCIN HYDROCHLORIDE 1500 MG: 1 INJECTION, POWDER, LYOPHILIZED, FOR SOLUTION INTRAVENOUS at 01:32

## 2017-10-27 RX ADMIN — FENTANYL CITRATE 25 MCG: 50 INJECTION INTRAMUSCULAR; INTRAVENOUS at 11:00

## 2017-10-27 RX ADMIN — MEROPENEM 1 G: 1 INJECTION, POWDER, FOR SOLUTION INTRAVENOUS at 01:32

## 2017-10-27 RX ADMIN — FENTANYL CITRATE 25 MCG: 50 INJECTION INTRAMUSCULAR; INTRAVENOUS at 16:56

## 2017-10-27 RX ADMIN — PHENYTOIN SODIUM 200 MG: 100 CAPSULE ORAL at 14:30

## 2017-10-27 RX ADMIN — DOCUSATE SODIUM 100 MG: 100 CAPSULE, LIQUID FILLED ORAL at 20:59

## 2017-10-27 RX ADMIN — METOPROLOL TARTRATE 75 MG: 50 TABLET ORAL at 09:02

## 2017-10-27 RX ADMIN — VANCOMYCIN HYDROCHLORIDE 1500 MG: 1 INJECTION, POWDER, LYOPHILIZED, FOR SOLUTION INTRAVENOUS at 20:51

## 2017-10-27 RX ADMIN — BUMETANIDE 1 MG: 0.25 INJECTION INTRAMUSCULAR; INTRAVENOUS at 20:59

## 2017-10-27 RX ADMIN — DOCUSATE SODIUM 100 MG: 100 CAPSULE, LIQUID FILLED ORAL at 09:02

## 2017-10-27 RX ADMIN — POTASSIUM CHLORIDE 20 MEQ: 20 TABLET, EXTENDED RELEASE ORAL at 09:02

## 2017-10-27 RX ADMIN — POTASSIUM CHLORIDE 20 MEQ: 20 TABLET, EXTENDED RELEASE ORAL at 20:59

## 2017-10-27 RX ADMIN — FINASTERIDE 5 MG: 5 TABLET, FILM COATED ORAL at 16:03

## 2017-10-27 RX ADMIN — DABIGATRAN ETEXILATE MESYLATE 150 MG: 150 CAPSULE ORAL at 20:59

## 2017-10-27 RX ADMIN — DABIGATRAN ETEXILATE MESYLATE 150 MG: 150 CAPSULE ORAL at 09:02

## 2017-10-27 RX ADMIN — METOPROLOL TARTRATE 100 MG: 50 TABLET ORAL at 20:59

## 2017-10-27 ASSESSMENT — PAIN SCALES - GENERAL
PAINLEVEL_OUTOF10: 10
PAINLEVEL_OUTOF10: 10

## 2017-10-27 NOTE — PROGRESS NOTES
[facility]  Progress Note                            Date:  10/27/2017  Patient: Luis Pardo  Admission:  10/25/2017 12:35 AM  Admit DX: Cellulitis [L03.90]  Age:  81 y.o., 1945     LOS: 2 days     Reason for evaluation:   atrial fibrillation      SUBJECTIVE:    The patient was seen and examined. Notes and labs reviewed. There were not complications over night. Patient's cardiac review of systems: positive for irregular rythm  The patient is generally unchanged. OBJECTIVE:    Telemetry: Atrial fibrillation       metoprolol tartrate  75 mg Oral BID    meropenem  1 g Intravenous Q8H    bumetanide  1 mg Oral Daily    potassium chloride  20 mEq Oral BID    vancomycin  1,500 mg Intravenous Q8H    docusate sodium  100 mg Oral BID    vancomycin (VANCOCIN) intermittent dosing (placeholder)   Other RX Placeholder    phenytoin  200 mg Oral TID    dabigatran  150 mg Oral BID                BP (!) 145/86   Pulse 91   Temp 97.8 °F (36.6 °C) (Temporal)   Resp 22   Ht 5' 11\" (1.803 m)   Wt (!) 458 lb 1 oz (207.8 kg)   SpO2 93%   BMI 63.89 kg/m²     Intake/Output Summary (Last 24 hours) at 10/27/17 1039  Last data filed at 10/27/17 0510   Gross per 24 hour   Intake              800 ml   Output              875 ml   Net              -75 ml           Labs:   CBC:   Recent Labs      10/25/17   0058  10/26/17   0347   WBC  21.2*  19.1*   HGB  14.0  11.9*   HCT  43.7  38.3*   PLT  348  304     BMP: Recent Labs      10/25/17   0115  10/26/17   0347   NA  139  139   K  3.1*  2.8*   CO2  42*  39*   BUN  16  12   CREATININE  0.8  0.6   LABGLOM  >60  >60   GLUCOSE  134*  154*     BNP: No results for input(s): BNP in the last 72 hours. PT/INR:   Recent Labs      10/25/17   0058   PROTIME  16.5*   INR  1.33*     APTT:No results for input(s): APTT in the last 72 hours.   CARDIAC ENZYMES:  Recent Labs      10/25/17   0047  10/25/17   0115   CKTOTAL   --   254   TROPONINI  0.00   --

## 2017-10-27 NOTE — PROGRESS NOTES
[facility] MEDICINE  - PROGRESS NOTE    Admit Date: 10/25/2017       Chief Complaint:  A fib, cellulitis follow up      Subjective:   Feels a little better today. Thinks the swelling and redness are improving. No chest pain or palpitations. No fevers or chills. Mild SOB, coughing from time to time. No other complaints today. ROS:  Pertinent items are noted in HPI. 14 point reveiw of systems otherwise negative. Data:   Scheduled Meds: Reviewed  Continuous Infusions:     Intake/Output Summary (Last 24 hours) at 10/27/17 1057  Last data filed at 10/27/17 0510   Gross per 24 hour   Intake              800 ml   Output              875 ml   Net              -75 ml     Recent Labs      10/25/17   0058  10/26/17   0347   WBC  21.2*  19.1*   HGB  14.0  11.9*   PLT  348  304     Recent Labs      10/25/17   0115  10/26/17   0347   NA  139  139   K  3.1*  2.8*   CL  90*  95*   CO2  42*  39*   BUN  16  12   CREATININE  0.8  0.6   GLUCOSE  134*  154*     Recent Labs      10/25/17   0115   AST  48*   ALT  33   BILITOT  1.2   ALKPHOS  89     Troponin T:   Recent Labs      10/25/17   0047   TROPONINI  0.00     Pro-BNP: No results for input(s): BNP in the last 72 hours. INR:   Recent Labs      10/25/17   0058   INR  1.33*     LE dopplers:  Impression     [place] is no evidence of deep venous thrombosis (DVT) in the left lower   extremity(ies).   There is no evidence of superficial thrombophlebitis of the left lower   extremity(ies).   The exam is technically limited due to edema and body habitus ; therefore   the left distal femoral, and tibial veins were not well visualized.      Signature      ----------------------------------------------------------------   Electronically signed by Nasima Morrow MD      XR TIBIA FIBULA LEFT (2 VIEWS) [[id number]] Resulted: 10/25/17 0814      Order Status: Completed Updated: 10/25/17 0716     Narrative:       Examination.  XR TIBIA FIBULA LEFT STANDARD  History: Cellulitis. The frontal and lateral views of the left tibia and fibula are  obtained. There is no previous study for comparison. This study is of very limited diagnostic value due to motion  artifacts. There is no evidence of recent fracture. No focal bony erosion. There is marked soft tissue swelling of the entire visualized lower  leg. The visualized knee joint and ankle show a mild chronic  osteoarthritis.     Impression:       No acute bony abnormality. Marked soft tissue swelling of the lower leg. Objective:   Vitals: BP (!) 145/86   Pulse 91   Temp 97.8 °F (36.6 °C) (Temporal)   Resp 22   Ht 5' 11\" (1.803 m)   Wt (!) 458 lb 1 oz (207.8 kg)   SpO2 93%   BMI 63.89 kg/m²   General appearance: No apparent distress, appears stated age and cooperative. HEENT: Normal cephalic, atraumatic without obvious deformity. Pupils equal, round, and reactive to light. Extra ocular muscles intact. Conjunctivae/corneas clear. Neck: Supple, with full range of motion. No jugular venous distention. Trachea midline. No thyroid tenderness. No masses palpated. Respiratory:  Normal respiratory effort. Clear to auscultation, bilaterally without Rales/Wheezes/Rhonchi. Cardiovascular:  Irregular and tachycardic with normal S1/S2 without murmurs, rubs or gallops. Abdomen: Soft, non-tender, non-distended with normal bowel sounds. No hepatosplenomegaly. Morbidly obesity. Musculoskeletal: No clubbing, cyanosis. 2-3+ edema LLE, 4+ RLE with erythema and warmth. Skin: \"Wound:  LLE edematous and red, no open areas at this time, no drainage, unable to obtain wound culture at this time. Noted dry whitish cream color plaque area to anterior and posterior calf. RLE half the size of left, small reddish area RLE distal calf. Aright and left heels dry and scaly, intact. Heels floating off the bed with pillows.   Patient able to turn with minimal assist, back side is clear, buttocks and coccyx intact. Scrotum enlarged, groin and scrotal folds moist and reddened. \" per wound care nurse, unable to roll patient today. Neurologic:  Neurovascularly intact without any focal sensory/motor deficits. Cranial nerves: II-XII intact, grossly non-focal.  Psychiatric: Alert and oriented, thought content appropriate, normal insight      Assessment & Plan: Active Hospital Problems    Diagnosis Date Noted    Hypokalemia [E87.6]      Priority: High    Persistent atrial fibrillation (HCC) [I48.1]      Priority: High    Lymphedema of both lower extremities [I89.0] 10/25/2017    Morbid obesity ([facility] [E66.01] 10/25/2017    Cellulitis of left lower extremity [L03.116] 10/25/2017    Hypophosphatemia [E83.39] 10/25/2017    Seizure disorder ([facility] [G40.909] 10/25/2017     Continue merrem. Continue vancomycin. Wound care. ACE wraps to bilateral LE. Bumex 1 mg BID. Add flomax. Replete potassium. Repeat labs in am.  Appreciate cardiology assistance which is following for a fib. Increase activity as tolerated. See all orders. Further recommendations per clinical course.         Chong Herman DO

## 2017-10-27 NOTE — PROGRESS NOTES
Patient visited by Spiritual Care volunteer BRIELLE Harris. Javy;s Witnesses. Contact completed. Family present.

## 2017-10-28 LAB
ANION GAP SERPL CALCULATED.3IONS-SCNC: 10 MMOL/L (ref 7–19)
BASOPHILS ABSOLUTE: 0.1 K/UL (ref 0–0.2)
BASOPHILS RELATIVE PERCENT: 0.5 % (ref 0–1)
BUN BLDV-MCNC: 11 MG/DL (ref 8–23)
CALCIUM SERPL-MCNC: 7.7 MG/DL (ref 8.8–10.2)
CHLORIDE BLD-SCNC: 96 MMOL/L (ref 98–111)
CO2: 33 MMOL/L (ref 22–29)
CREAT SERPL-MCNC: 0.6 MG/DL (ref 0.5–1.2)
EOSINOPHILS ABSOLUTE: 0.1 K/UL (ref 0–0.6)
EOSINOPHILS RELATIVE PERCENT: 1.1 % (ref 0–5)
GFR NON-AFRICAN AMERICAN: >60
GLUCOSE BLD-MCNC: 98 MG/DL (ref 74–109)
HCT VFR BLD CALC: 37.9 % (ref 42–52)
HEMOGLOBIN: 12 G/DL (ref 14–18)
LYMPHOCYTES ABSOLUTE: 1.7 K/UL (ref 1.1–4.5)
LYMPHOCYTES RELATIVE PERCENT: 13 % (ref 20–40)
MCH RBC QN AUTO: 31.3 PG (ref 27–31)
MCHC RBC AUTO-ENTMCNC: 31.7 G/DL (ref 33–37)
MCV RBC AUTO: 98.7 FL (ref 80–94)
MONOCYTES ABSOLUTE: 1 K/UL (ref 0–0.9)
MONOCYTES RELATIVE PERCENT: 7.4 % (ref 0–10)
NEUTROPHILS ABSOLUTE: 10.1 K/UL (ref 1.5–7.5)
NEUTROPHILS RELATIVE PERCENT: 75.7 % (ref 50–65)
PDW BLD-RTO: 14.5 % (ref 11.5–14.5)
PLATELET # BLD: 342 K/UL (ref 130–400)
PMV BLD AUTO: 9.4 FL (ref 9.4–12.4)
POTASSIUM SERPL-SCNC: 3.4 MMOL/L (ref 3.5–5)
RBC # BLD: 3.84 M/UL (ref 4.7–6.1)
SODIUM BLD-SCNC: 139 MMOL/L (ref 136–145)
WBC # BLD: 13.3 K/UL (ref 4.8–10.8)

## 2017-10-28 PROCEDURE — 36415 COLL VENOUS BLD VENIPUNCTURE: CPT

## 2017-10-28 PROCEDURE — 1210000000 HC MED SURG R&B

## 2017-10-28 PROCEDURE — 6370000000 HC RX 637 (ALT 250 FOR IP): Performed by: INTERNAL MEDICINE

## 2017-10-28 PROCEDURE — 99232 SBSQ HOSP IP/OBS MODERATE 35: CPT | Performed by: INTERNAL MEDICINE

## 2017-10-28 PROCEDURE — 6360000002 HC RX W HCPCS: Performed by: INTERNAL MEDICINE

## 2017-10-28 PROCEDURE — 2580000003 HC RX 258: Performed by: INTERNAL MEDICINE

## 2017-10-28 PROCEDURE — 6370000000 HC RX 637 (ALT 250 FOR IP): Performed by: HOSPITALIST

## 2017-10-28 PROCEDURE — 80048 BASIC METABOLIC PNL TOTAL CA: CPT

## 2017-10-28 PROCEDURE — 85025 COMPLETE CBC W/AUTO DIFF WBC: CPT

## 2017-10-28 PROCEDURE — 2500000003 HC RX 250 WO HCPCS: Performed by: INTERNAL MEDICINE

## 2017-10-28 RX ORDER — HYDROCODONE BITARTRATE AND ACETAMINOPHEN 5; 325 MG/1; MG/1
1 TABLET ORAL EVERY 6 HOURS PRN
Status: DISCONTINUED | OUTPATIENT
Start: 2017-10-28 | End: 2017-10-30

## 2017-10-28 RX ADMIN — DABIGATRAN ETEXILATE MESYLATE 150 MG: 150 CAPSULE ORAL at 20:53

## 2017-10-28 RX ADMIN — PHENYTOIN SODIUM 200 MG: 100 CAPSULE ORAL at 14:11

## 2017-10-28 RX ADMIN — POTASSIUM CHLORIDE 20 MEQ: 20 TABLET, EXTENDED RELEASE ORAL at 20:53

## 2017-10-28 RX ADMIN — DABIGATRAN ETEXILATE MESYLATE 150 MG: 150 CAPSULE ORAL at 10:32

## 2017-10-28 RX ADMIN — BUMETANIDE 1 MG: 0.25 INJECTION INTRAMUSCULAR; INTRAVENOUS at 20:53

## 2017-10-28 RX ADMIN — PHENYTOIN SODIUM 200 MG: 100 CAPSULE ORAL at 20:52

## 2017-10-28 RX ADMIN — VANCOMYCIN HYDROCHLORIDE 1500 MG: 1 INJECTION, POWDER, LYOPHILIZED, FOR SOLUTION INTRAVENOUS at 05:01

## 2017-10-28 RX ADMIN — METOPROLOL TARTRATE 100 MG: 50 TABLET ORAL at 10:32

## 2017-10-28 RX ADMIN — MEROPENEM 1 G: 1 INJECTION, POWDER, FOR SOLUTION INTRAVENOUS at 09:30

## 2017-10-28 RX ADMIN — MEROPENEM 1 G: 1 INJECTION, POWDER, FOR SOLUTION INTRAVENOUS at 18:47

## 2017-10-28 RX ADMIN — POTASSIUM CHLORIDE 20 MEQ: 20 TABLET, EXTENDED RELEASE ORAL at 10:31

## 2017-10-28 RX ADMIN — DOCUSATE SODIUM 100 MG: 100 CAPSULE, LIQUID FILLED ORAL at 10:31

## 2017-10-28 RX ADMIN — DOCUSATE SODIUM 100 MG: 100 CAPSULE, LIQUID FILLED ORAL at 20:53

## 2017-10-28 RX ADMIN — METOPROLOL TARTRATE 100 MG: 50 TABLET ORAL at 20:53

## 2017-10-28 RX ADMIN — BUMETANIDE 1 MG: 0.25 INJECTION INTRAMUSCULAR; INTRAVENOUS at 10:31

## 2017-10-28 RX ADMIN — HYDROCODONE BITARTRATE AND ACETAMINOPHEN 1 TABLET: 5; 325 TABLET ORAL at 14:11

## 2017-10-28 RX ADMIN — VANCOMYCIN HYDROCHLORIDE 1500 MG: 1 INJECTION, POWDER, LYOPHILIZED, FOR SOLUTION INTRAVENOUS at 20:54

## 2017-10-28 RX ADMIN — PHENYTOIN SODIUM 200 MG: 100 CAPSULE ORAL at 10:31

## 2017-10-28 RX ADMIN — FINASTERIDE 5 MG: 5 TABLET, FILM COATED ORAL at 14:10

## 2017-10-28 RX ADMIN — MEROPENEM 1 G: 1 INJECTION, POWDER, FOR SOLUTION INTRAVENOUS at 01:27

## 2017-10-28 RX ADMIN — VANCOMYCIN HYDROCHLORIDE 1500 MG: 1 INJECTION, POWDER, LYOPHILIZED, FOR SOLUTION INTRAVENOUS at 14:15

## 2017-10-28 RX ADMIN — HYDROCODONE BITARTRATE AND ACETAMINOPHEN 1 TABLET: 5; 325 TABLET ORAL at 20:53

## 2017-10-28 ASSESSMENT — PAIN SCALES - GENERAL
PAINLEVEL_OUTOF10: 10
PAINLEVEL_OUTOF10: 10

## 2017-10-28 NOTE — PROGRESS NOTES
[address]    [facility] Progress Note                            Date:  10/28/17  Patient: Carrol Duff  Admission:  10/25/2017 12:35 AM  Admit DX: Cellulitis [L03.90]  Age:  81 y.o., 1945     LOS: 3 days       Reason for initial evaluation or the patient's initial complaint    Atrial fibrillation       SUBJECTIVE:      10/25/2017    Chief Complaint / Reason for the Visit   Follow up of:  Atrial fibrillation and hypertension and cellulitis      Specialty Problems        Cardiology Problems    Persistent atrial fibrillation [facility])              Current Status Today According to the patient:  \"better\"    Subjective:  Mr. Carrol Duff is generally feeling unchanged. Stable, the patient has no complaints or symptoms. There are no new problems noted overnight. Mr. Carrol Duff has the following cardiac complaints / symptoms today:    1. Atrial fibrillation, on eliqis and lopressor, Is stable on current medications     2. hypertension, Is stable on current medications     3. cellulits, on antibiotics        Carrol Duff is a 81 y.o. male with the following history as recorded in EpicCare:    Patient Active Problem List    Diagnosis Date Noted    Hypokalemia      Priority: High    Persistent atrial fibrillation ([facility]      Priority: High    Lymphedema of both lower extremities 10/25/2017     Priority: Low    Morbid obesity ([address] 10/25/2017     Priority: Low    Cellulitis of left lower extremity 10/25/2017     Priority: Low    Hypophosphatemia 10/25/2017     Priority: Low    Seizure disorder ([facility] 10/25/2017     Priority: Low     Current Facility-Administered Medications   Medication Dose Route Frequency Provider Last Rate Last Dose    HYDROcodone-acetaminophen (NORCO) 5-325 MG per tablet 1 tablet  1 tablet Oral Q6H PRN Chong Herman DO   1 tablet at 10/28/17 1411    metoprolol tartrate (LOPRESSOR) tablet 100 mg  100 mg Oral BID BRIELLE Arita MD   100 mg at 10/28/17 1032    bumetanide (BUMEX) injection 1 mg  1 mg Intravenous BID Chong D Batsheva, DO   1 mg at 10/28/17 1031    finasteride (PROSCAR) tablet 5 mg  5 mg Oral Daily Chong D Batsheva, DO   5 mg at 10/28/17 1410    meropenem (MERREM) 1 g in sterile water 20 mL IV syringe  1 g Intravenous Q8H Chong D Batsheva, DO   1 g at 10/28/17 1847    potassium chloride 10 mEq/100 mL IVPB (Peripheral Line)  10 mEq Intravenous PRN Chong D Batsheva, DO        potassium chloride (KLOR-CON M) extended release tablet 20 mEq  20 mEq Oral BID Chong D Batsheva, DO   20 mEq at 10/28/17 1031    vancomycin (VANCOCIN) 1,500 mg in dextrose 5 % 500 mL IVPB  1,500 mg Intravenous Q8H Chong D Batsheva, DO   Stopped at 10/28/17 1613    acetaminophen (TYLENOL) tablet 650 mg  650 mg Oral Q4H PRN Ollie Gleason MD        docusate sodium (COLACE) capsule 100 mg  100 mg Oral BID Ollie Gleason MD   100 mg at 10/28/17 1031    bisacodyl (DULCOLAX) suppository 10 mg  10 mg Rectal Daily PRN Ollie Gleason MD        ondansetron (ZOFRAN) injection 4 mg  4 mg Intravenous Q6H PRN Celine Rutherford MD        vancomycin (VANCOCIN) intermittent dosing (placeholder)   Other RX Placeholder Ollie Gleason MD        diazepam (VALIUM) tablet 5 mg  5 mg Oral Q12H PRN Torie Maxwell MD        phenytoin (DILANTIN) ER capsule 200 mg  200 mg Oral TID Torie Maxwell MD   200 mg at 10/28/17 1411    dabigatran (PRADAXA) capsule 150 mg  150 mg Oral BID BRIELLE Martin MD   150 mg at 10/28/17 1032     Allergies: Sulfa antibiotics  Past Medical History:   Diagnosis Date    Anxiety     Cerebral hemorrhage ([address]     Hypertension      Past Surgical History:   Procedure Laterality Date    TONSILLECTOMY       History reviewed. No pertinent family history.   Social History   Substance Use Topics    Smoking status: Never Smoker    Smokeless tobacco: Never Used    Alcohol use Yes      Comment: on occasion          Review of Systems:    General:      Complaint / Lopressor 25mg BID. On Lovenox      CXR: Suboptimal exam in that the AP radiograph is underexposed. This limits visualization of the lung bases. No infiltrates are identified on the lateral radiograph. If there is high clinical suspicion, a repeat AP or PA chest radiograph would be recommended.        [id number] Score for Atrial Fibrillation Stroke Risk    Risk   Factors   Component Value   C CHF No 0   H HTN No 0   A2 Age >= 76 No,  (71 y.o.) 0   D DM No 0   S2 Prior Stroke/TIA No 0   V Vascular Disease No 0   A Age 74-69 Yes,  (71 y.o.) 1   Sc Sex male 0     [id number]  Score   1   Score last updated 10/25/17 2:53 PM       Technically difficult examination.   The aortic valve is trileaflet, moderately thickened & calcified with good   leaflet separation.   No evidence of aortic stenosis or aortic regurgitation.   Mild tricuspid regurgitation with estimated RVSP of 51 mm Hg.   Mildly dilated left atrium.   Normal left ventricular size with preserved LV function and an estimated   ejection fraction of approximately 55-60%. Continue current medications:      yes                 2. Hypertension Initial presentation during this evaluation Patient has a history of hypertension, which is managed and is on current therapy. The blood pressure history is:    Systolic (61RFA), NTP:718 , Min:114 , VFR:751    Diastolic (47DUA), RBP:03, Min:73, Max:88    No Continue current medications:     yes                 3. HUMBERTO- Cellulitis  Initial presentation during this evaluation Left leg venous scan: There is no evidence of deep venous thrombosis (DVT) in the left lower extremity(ies). [place] is no evidence of superficial thrombophlebitis of the left lower  extremity(ies).   The exam is technically limited due to edema and body habitus ; therefore  the left distal femoral, and tibial veins were not well visualized. Tibia/Fibula Xray:    No acute bony abnormality.   Marked soft tissue swelling of the lower leg.        WBC 21.2;

## 2017-10-28 NOTE — PROGRESS NOTES
[facility] MEDICINE  - PROGRESS NOTE    Admit Date: 10/25/2017       Chief Complaint:  A fib, cellulitis follow up      Subjective:   Feels a little better today. Thinks the swelling and redness continue to improve. Having some increased pain in his LE today. No chest pain or palpitations. No fevers or chills. Mild SOB, coughing from time to time. No other complaints today. ROS:  Pertinent items are noted in HPI. 14 point reveiw of systems otherwise negative. Data:   Scheduled Meds: Reviewed  Continuous Infusions:     Intake/Output Summary (Last 24 hours) at 10/28/17 1145  Last data filed at 10/28/17 1012   Gross per 24 hour   Intake             2630 ml   Output             1850 ml   Net              780 ml     Recent Labs      10/26/17   0347  10/28/17   0315   WBC  19.1*  13.3*   HGB  11.9*  12.0*   PLT  304  342     Recent Labs      10/26/17   0347  10/28/17   0315   NA  139  139   K  2.8*  3.4*   CL  95*  96*   CO2  39*  33*   BUN  12  11   CREATININE  0.6  0.6   GLUCOSE  154*  98     No results for input(s): AST, ALT, ALB, BILITOT, ALKPHOS in the last 72 hours. Troponin T:   No results for input(s): TROPONINI in the last 72 hours. Pro-BNP: No results for input(s): BNP in the last 72 hours. INR:   No results for input(s): INR in the last 72 hours.   LE dopplers:  Impression     [place] is no evidence of deep venous thrombosis (DVT) in the left lower   extremity(ies).   There is no evidence of superficial thrombophlebitis of the left lower   extremity(ies).   The exam is technically limited due to edema and body habitus ; therefore   the left distal femoral, and tibial veins were not well visualized.      Signature      ----------------------------------------------------------------   Electronically signed by Lisa Weathers MD      XR TIBIA FIBULA LEFT (2 VIEWS) [[id number]] Resulted: 10/25/17 0814      Order Status: Completed Updated:

## 2017-10-28 NOTE — PROGRESS NOTES
Pharmacy Vancomycin Consult     Vancomycin Day: 3  Current Dosin mg iv every 8 hours    Temp max:  97.9    Recent Labs      10/25/17   0115  10/26/17   0347   BUN  16  12       Recent Labs      10/25/17   0115  10/26/17   0347   CREATININE  0.8  0.6       Recent Labs      10/25/17   0058  10/26/17   0347   WBC  21.2*  19.1*         Intake/Output Summary (Last 24 hours) at 10/27/17 1950  Last data filed at 10/27/17 1805   Gross per 24 hour   Intake 1210 ml   Output 1750 ml   Net -540 ml       Culture Date Source Results   10/25/17 wound No results yet   10/25/17 urine No growth   10/25/17 blood No growth       Ht Readings from Last 1 Encounters:   10/25/17 5' 11\" (1.803 m)        Wt Readings from Last 1 Encounters:   10/27/17 (!) 458 lb 1 oz (207.8 kg)         Body mass index is 63.89 kg/m². Estimated Creatinine Clearance: 208 mL/min (based on SCr of 0.6 mg/dL).     Trough: 17.9    Assessment/Plan: level good will continue current treatment    Electronically signed by Janett Tong, [facility] on 10/27/2017 at 7:50 PM

## 2017-10-29 LAB
ANION GAP SERPL CALCULATED.3IONS-SCNC: 6 MMOL/L (ref 7–19)
BASOPHILS ABSOLUTE: 0 K/UL (ref 0–0.2)
BASOPHILS RELATIVE PERCENT: 0.3 % (ref 0–1)
BUN BLDV-MCNC: 11 MG/DL (ref 8–23)
CALCIUM SERPL-MCNC: 7.9 MG/DL (ref 8.8–10.2)
CHLORIDE BLD-SCNC: 97 MMOL/L (ref 98–111)
CO2: 38 MMOL/L (ref 22–29)
CREAT SERPL-MCNC: 0.6 MG/DL (ref 0.5–1.2)
EOSINOPHILS ABSOLUTE: 0.2 K/UL (ref 0–0.6)
EOSINOPHILS RELATIVE PERCENT: 1.3 % (ref 0–5)
GFR NON-AFRICAN AMERICAN: >60
GLUCOSE BLD-MCNC: 104 MG/DL (ref 74–109)
GRAM STAIN RESULT: ABNORMAL
HCT VFR BLD CALC: 37 % (ref 42–52)
HEMOGLOBIN: 11.7 G/DL (ref 14–18)
LYMPHOCYTES ABSOLUTE: 2.1 K/UL (ref 1.1–4.5)
LYMPHOCYTES RELATIVE PERCENT: 14.6 % (ref 20–40)
MCH RBC QN AUTO: 31.5 PG (ref 27–31)
MCHC RBC AUTO-ENTMCNC: 31.6 G/DL (ref 33–37)
MCV RBC AUTO: 99.5 FL (ref 80–94)
MONOCYTES ABSOLUTE: 1 K/UL (ref 0–0.9)
MONOCYTES RELATIVE PERCENT: 7.2 % (ref 0–10)
NEUTROPHILS ABSOLUTE: 10.5 K/UL (ref 1.5–7.5)
NEUTROPHILS RELATIVE PERCENT: 74.9 % (ref 50–65)
ORGANISM: ABNORMAL
PDW BLD-RTO: 14.5 % (ref 11.5–14.5)
PLATELET # BLD: 365 K/UL (ref 130–400)
PMV BLD AUTO: 9.3 FL (ref 9.4–12.4)
POTASSIUM SERPL-SCNC: 3.7 MMOL/L (ref 3.5–5)
RBC # BLD: 3.72 M/UL (ref 4.7–6.1)
SODIUM BLD-SCNC: 141 MMOL/L (ref 136–145)
WBC # BLD: 14 K/UL (ref 4.8–10.8)
WOUND/ABSCESS: ABNORMAL

## 2017-10-29 PROCEDURE — 6370000000 HC RX 637 (ALT 250 FOR IP): Performed by: HOSPITALIST

## 2017-10-29 PROCEDURE — 99232 SBSQ HOSP IP/OBS MODERATE 35: CPT | Performed by: INTERNAL MEDICINE

## 2017-10-29 PROCEDURE — 2500000003 HC RX 250 WO HCPCS: Performed by: INTERNAL MEDICINE

## 2017-10-29 PROCEDURE — 80048 BASIC METABOLIC PNL TOTAL CA: CPT

## 2017-10-29 PROCEDURE — 2580000003 HC RX 258: Performed by: INTERNAL MEDICINE

## 2017-10-29 PROCEDURE — 6370000000 HC RX 637 (ALT 250 FOR IP): Performed by: INTERNAL MEDICINE

## 2017-10-29 PROCEDURE — 85025 COMPLETE CBC W/AUTO DIFF WBC: CPT

## 2017-10-29 PROCEDURE — 6360000002 HC RX W HCPCS: Performed by: INTERNAL MEDICINE

## 2017-10-29 PROCEDURE — 1210000000 HC MED SURG R&B

## 2017-10-29 PROCEDURE — 36415 COLL VENOUS BLD VENIPUNCTURE: CPT

## 2017-10-29 RX ORDER — DOXYCYCLINE HYCLATE 100 MG/1
100 CAPSULE ORAL EVERY 12 HOURS SCHEDULED
Status: DISCONTINUED | OUTPATIENT
Start: 2017-10-29 | End: 2017-11-02 | Stop reason: HOSPADM

## 2017-10-29 RX ORDER — AMOXICILLIN AND CLAVULANATE POTASSIUM 875; 125 MG/1; MG/1
1 TABLET, FILM COATED ORAL EVERY 12 HOURS SCHEDULED
Status: DISCONTINUED | OUTPATIENT
Start: 2017-10-29 | End: 2017-11-02 | Stop reason: HOSPADM

## 2017-10-29 RX ADMIN — DOCUSATE SODIUM 100 MG: 100 CAPSULE, LIQUID FILLED ORAL at 08:07

## 2017-10-29 RX ADMIN — DABIGATRAN ETEXILATE MESYLATE 150 MG: 150 CAPSULE ORAL at 08:07

## 2017-10-29 RX ADMIN — PHENYTOIN SODIUM 200 MG: 100 CAPSULE ORAL at 21:15

## 2017-10-29 RX ADMIN — AMOXICILLIN AND CLAVULANATE POTASSIUM 1 TABLET: 875; 125 TABLET, FILM COATED ORAL at 21:15

## 2017-10-29 RX ADMIN — BUMETANIDE 1 MG: 0.25 INJECTION INTRAMUSCULAR; INTRAVENOUS at 21:16

## 2017-10-29 RX ADMIN — VANCOMYCIN HYDROCHLORIDE 1500 MG: 1 INJECTION, POWDER, LYOPHILIZED, FOR SOLUTION INTRAVENOUS at 12:07

## 2017-10-29 RX ADMIN — PHENYTOIN SODIUM 200 MG: 100 CAPSULE ORAL at 08:07

## 2017-10-29 RX ADMIN — BUMETANIDE 1 MG: 0.25 INJECTION INTRAMUSCULAR; INTRAVENOUS at 08:07

## 2017-10-29 RX ADMIN — PHENYTOIN SODIUM 200 MG: 100 CAPSULE ORAL at 12:07

## 2017-10-29 RX ADMIN — DABIGATRAN ETEXILATE MESYLATE 150 MG: 150 CAPSULE ORAL at 21:15

## 2017-10-29 RX ADMIN — HYDROCODONE BITARTRATE AND ACETAMINOPHEN 1 TABLET: 5; 325 TABLET ORAL at 21:18

## 2017-10-29 RX ADMIN — VANCOMYCIN HYDROCHLORIDE 1500 MG: 1 INJECTION, POWDER, LYOPHILIZED, FOR SOLUTION INTRAVENOUS at 05:40

## 2017-10-29 RX ADMIN — MEROPENEM 1 G: 1 INJECTION, POWDER, FOR SOLUTION INTRAVENOUS at 02:48

## 2017-10-29 RX ADMIN — DOCUSATE SODIUM 100 MG: 100 CAPSULE, LIQUID FILLED ORAL at 21:15

## 2017-10-29 RX ADMIN — POTASSIUM CHLORIDE 20 MEQ: 20 TABLET, EXTENDED RELEASE ORAL at 08:06

## 2017-10-29 RX ADMIN — DIAZEPAM 5 MG: 5 TABLET ORAL at 15:19

## 2017-10-29 RX ADMIN — POTASSIUM CHLORIDE 20 MEQ: 20 TABLET, EXTENDED RELEASE ORAL at 21:15

## 2017-10-29 RX ADMIN — METOPROLOL TARTRATE 100 MG: 50 TABLET ORAL at 21:15

## 2017-10-29 RX ADMIN — FINASTERIDE 5 MG: 5 TABLET, FILM COATED ORAL at 12:08

## 2017-10-29 RX ADMIN — HYDROCODONE BITARTRATE AND ACETAMINOPHEN 1 TABLET: 5; 325 TABLET ORAL at 08:06

## 2017-10-29 RX ADMIN — DOXYCYCLINE HYCLATE 100 MG: 100 CAPSULE, GELATIN COATED ORAL at 21:16

## 2017-10-29 RX ADMIN — HYDROCODONE BITARTRATE AND ACETAMINOPHEN 1 TABLET: 5; 325 TABLET ORAL at 15:06

## 2017-10-29 RX ADMIN — METOPROLOL TARTRATE 100 MG: 50 TABLET ORAL at 08:07

## 2017-10-29 ASSESSMENT — PAIN SCALES - GENERAL
PAINLEVEL_OUTOF10: 8
PAINLEVEL_OUTOF10: 6
PAINLEVEL_OUTOF10: 8

## 2017-10-29 NOTE — PROGRESS NOTES
12 hr chart check complete. Pt in bed no s/s of distress. Fall precautions in place. Call light and bed table in reach. Will continue to monitor.  Electronically signed by Glynn Leal RN on 10/29/2017 at 4:53 AM

## 2017-10-29 NOTE — PROGRESS NOTES
[facility] MEDICINE  - PROGRESS NOTE    Admit Date: 10/25/2017       Chief Complaint:  A fib, cellulitis follow up      Subjective:   Feels a little better today. Thinks the swelling and redness continue to improve. Having some increased pain in his LE again today. No chest pain or palpitations. No fevers or chills. Mild SOB, coughing from time to time. No other complaints today. Agreeable to rehab. ROS:  Pertinent items are noted in HPI. 14 point reveiw of systems otherwise negative. Data:   Scheduled Meds: Reviewed  Continuous Infusions:     Intake/Output Summary (Last 24 hours) at 10/29/17 1129  Last data filed at 10/29/17 1001   Gross per 24 hour   Intake             2497 ml   Output             2150 ml   Net              347 ml     Recent Labs      10/28/17   0315  10/29/17   0253   WBC  13.3*  14.0*   HGB  12.0*  11.7*   PLT  342  365     Recent Labs      10/28/17   0315  10/29/17   0253   NA  139  141   K  3.4*  3.7   CL  96*  97*   CO2  33*  38*   BUN  11  11   CREATININE  0.6  0.6   GLUCOSE  98  104     No results for input(s): AST, ALT, ALB, BILITOT, ALKPHOS in the last 72 hours. Troponin T:   No results for input(s): TROPONINI in the last 72 hours. Pro-BNP: No results for input(s): BNP in the last 72 hours. INR:   No results for input(s): INR in the last 72 hours.   LE dopplers:  Impression     Dariel Christensenr is no evidence of deep venous thrombosis (DVT) in the left lower   extremity(ies).   There is no evidence of superficial thrombophlebitis of the left lower   extremity(ies).   The exam is technically limited due to edema and body habitus ; therefore   the left distal femoral, and tibial veins were not well visualized.      Signature      ----------------------------------------------------------------   Electronically signed by Lisa Weathers MD      XR TIBIA FIBULA LEFT (2 VIEWS) [[id number]] Resulted: 10/25/17 5736      Order Status: Completed Updated: 10/25/17 0716     Narrative:       Examination. XR TIBIA FIBULA LEFT STANDARD  History: Cellulitis. The frontal and lateral views of the left tibia and fibula are  obtained. There is no previous study for comparison. This study is of very limited diagnostic value due to motion  artifacts. There is no evidence of recent fracture. No focal bony erosion. There is marked soft tissue swelling of the entire visualized lower  leg. The visualized knee joint and ankle show a mild chronic  osteoarthritis.     Impression:       No acute bony abnormality. Marked soft tissue swelling of the lower leg. Objective:   Vitals: /80   Pulse 89   Temp 97.6 °F (36.4 °C) (Temporal)   Resp 20   Ht 5' 11\" (1.803 m)   Wt (!) 455 lb 2 oz (206.4 kg)   SpO2 93%   BMI 63.48 kg/m²   General appearance: No apparent distress, appears stated age and cooperative. HEENT: Normal cephalic, atraumatic without obvious deformity. Pupils equal, round, and reactive to light. Extra ocular muscles intact. Conjunctivae/corneas clear. Neck: Supple, with full range of motion. No jugular venous distention. Trachea midline. No thyroid tenderness. No masses palpated. Respiratory:  Normal respiratory effort. Clear to auscultation, bilaterally without Rales/Wheezes/Rhonchi. Cardiovascular:  Irregular and tachycardic with normal S1/S2 without murmurs, rubs or gallops. Abdomen: Soft, non-tender, non-distended with normal bowel sounds. No hepatosplenomegaly. Morbidly obesity. Musculoskeletal: No clubbing, cyanosis. 2-3+ edema LLE, 4+ RLE with erythema and warmth, both improving. Skin: \"Wound:  LLE edematous and red, no open areas at this time, no drainage, unable to obtain wound culture at this time. Noted dry whitish cream color plaque area to anterior and posterior calf. RLE half the size of left, small reddish area RLE distal calf. Aright and left heels dry and scaly, intact.  Heels floating off the bed

## 2017-10-30 LAB
ANION GAP SERPL CALCULATED.3IONS-SCNC: 6 MMOL/L (ref 7–19)
BASOPHILS ABSOLUTE: 0.1 K/UL (ref 0–0.2)
BASOPHILS RELATIVE PERCENT: 0.4 % (ref 0–1)
BLOOD CULTURE, ROUTINE: NORMAL
BLOOD CULTURE, ROUTINE: NORMAL
BUN BLDV-MCNC: 11 MG/DL (ref 8–23)
CALCIUM SERPL-MCNC: 8 MG/DL (ref 8.8–10.2)
CHLORIDE BLD-SCNC: 98 MMOL/L (ref 98–111)
CO2: 36 MMOL/L (ref 22–29)
CREAT SERPL-MCNC: 0.6 MG/DL (ref 0.5–1.2)
CULTURE, BLOOD 2: NORMAL
CULTURE, BLOOD 2: NORMAL
EOSINOPHILS ABSOLUTE: 0.2 K/UL (ref 0–0.6)
EOSINOPHILS RELATIVE PERCENT: 1.2 % (ref 0–5)
GFR NON-AFRICAN AMERICAN: >60
GLUCOSE BLD-MCNC: 103 MG/DL (ref 74–109)
HCT VFR BLD CALC: 39 % (ref 42–52)
HEMOGLOBIN: 12.2 G/DL (ref 14–18)
LYMPHOCYTES ABSOLUTE: 2.2 K/UL (ref 1.1–4.5)
LYMPHOCYTES RELATIVE PERCENT: 16.1 % (ref 20–40)
MCH RBC QN AUTO: 31.3 PG (ref 27–31)
MCHC RBC AUTO-ENTMCNC: 31.3 G/DL (ref 33–37)
MCV RBC AUTO: 100 FL (ref 80–94)
MONOCYTES ABSOLUTE: 1 K/UL (ref 0–0.9)
MONOCYTES RELATIVE PERCENT: 7.1 % (ref 0–10)
NEUTROPHILS ABSOLUTE: 10.1 K/UL (ref 1.5–7.5)
NEUTROPHILS RELATIVE PERCENT: 73.9 % (ref 50–65)
PDW BLD-RTO: 14.4 % (ref 11.5–14.5)
PLATELET # BLD: 397 K/UL (ref 130–400)
PMV BLD AUTO: 9.1 FL (ref 9.4–12.4)
POTASSIUM SERPL-SCNC: 4.3 MMOL/L (ref 3.5–5)
RBC # BLD: 3.9 M/UL (ref 4.7–6.1)
SODIUM BLD-SCNC: 140 MMOL/L (ref 136–145)
WBC # BLD: 13.6 K/UL (ref 4.8–10.8)

## 2017-10-30 PROCEDURE — 85025 COMPLETE CBC W/AUTO DIFF WBC: CPT

## 2017-10-30 PROCEDURE — 6370000000 HC RX 637 (ALT 250 FOR IP): Performed by: INTERNAL MEDICINE

## 2017-10-30 PROCEDURE — 99232 SBSQ HOSP IP/OBS MODERATE 35: CPT | Performed by: INTERNAL MEDICINE

## 2017-10-30 PROCEDURE — 80048 BASIC METABOLIC PNL TOTAL CA: CPT

## 2017-10-30 PROCEDURE — 6370000000 HC RX 637 (ALT 250 FOR IP): Performed by: HOSPITALIST

## 2017-10-30 PROCEDURE — 36415 COLL VENOUS BLD VENIPUNCTURE: CPT

## 2017-10-30 PROCEDURE — 1210000000 HC MED SURG R&B

## 2017-10-30 PROCEDURE — 2500000003 HC RX 250 WO HCPCS: Performed by: INTERNAL MEDICINE

## 2017-10-30 RX ORDER — PANTOPRAZOLE SODIUM 40 MG/1
40 TABLET, DELAYED RELEASE ORAL
Status: DISCONTINUED | OUTPATIENT
Start: 2017-10-31 | End: 2017-11-02 | Stop reason: HOSPADM

## 2017-10-30 RX ORDER — CALCIUM CARBONATE 200(500)MG
500 TABLET,CHEWABLE ORAL 3 TIMES DAILY PRN
Status: DISCONTINUED | OUTPATIENT
Start: 2017-10-30 | End: 2017-11-02 | Stop reason: HOSPADM

## 2017-10-30 RX ORDER — OXYCODONE AND ACETAMINOPHEN 7.5; 325 MG/1; MG/1
1 TABLET ORAL EVERY 6 HOURS PRN
Status: DISCONTINUED | OUTPATIENT
Start: 2017-10-30 | End: 2017-11-02 | Stop reason: HOSPADM

## 2017-10-30 RX ORDER — LANOLIN ALCOHOL/MO/W.PET/CERES
CREAM (GRAM) TOPICAL 2 TIMES DAILY
Status: DISCONTINUED | OUTPATIENT
Start: 2017-10-30 | End: 2017-11-02

## 2017-10-30 RX ADMIN — DABIGATRAN ETEXILATE MESYLATE 150 MG: 150 CAPSULE ORAL at 08:35

## 2017-10-30 RX ADMIN — PHENYTOIN SODIUM 200 MG: 100 CAPSULE ORAL at 20:06

## 2017-10-30 RX ADMIN — OXYCODONE HYDROCHLORIDE AND ACETAMINOPHEN 1 TABLET: 7.5; 325 TABLET ORAL at 14:03

## 2017-10-30 RX ADMIN — DOXYCYCLINE HYCLATE 100 MG: 100 CAPSULE, GELATIN COATED ORAL at 20:06

## 2017-10-30 RX ADMIN — Medication: at 20:07

## 2017-10-30 RX ADMIN — POTASSIUM CHLORIDE 20 MEQ: 20 TABLET, EXTENDED RELEASE ORAL at 20:06

## 2017-10-30 RX ADMIN — CALCIUM CARBONATE (ANTACID) CHEW TAB 500 MG 500 MG: 500 CHEW TAB at 20:19

## 2017-10-30 RX ADMIN — DABIGATRAN ETEXILATE MESYLATE 150 MG: 150 CAPSULE ORAL at 20:05

## 2017-10-30 RX ADMIN — BUMETANIDE 1 MG: 0.25 INJECTION INTRAMUSCULAR; INTRAVENOUS at 20:23

## 2017-10-30 RX ADMIN — BUMETANIDE 1 MG: 0.25 INJECTION INTRAMUSCULAR; INTRAVENOUS at 08:35

## 2017-10-30 RX ADMIN — HYDROCODONE BITARTRATE AND ACETAMINOPHEN 1 TABLET: 5; 325 TABLET ORAL at 05:40

## 2017-10-30 RX ADMIN — PHENYTOIN SODIUM 200 MG: 100 CAPSULE ORAL at 08:42

## 2017-10-30 RX ADMIN — DOCUSATE SODIUM 100 MG: 100 CAPSULE, LIQUID FILLED ORAL at 08:35

## 2017-10-30 RX ADMIN — METOPROLOL TARTRATE 100 MG: 50 TABLET ORAL at 20:05

## 2017-10-30 RX ADMIN — METOPROLOL TARTRATE 100 MG: 50 TABLET ORAL at 08:39

## 2017-10-30 RX ADMIN — FINASTERIDE 5 MG: 5 TABLET, FILM COATED ORAL at 08:39

## 2017-10-30 RX ADMIN — PHENYTOIN SODIUM 200 MG: 100 CAPSULE ORAL at 14:03

## 2017-10-30 RX ADMIN — DOXYCYCLINE HYCLATE 100 MG: 100 CAPSULE, GELATIN COATED ORAL at 08:39

## 2017-10-30 RX ADMIN — AMOXICILLIN AND CLAVULANATE POTASSIUM 1 TABLET: 875; 125 TABLET, FILM COATED ORAL at 08:39

## 2017-10-30 RX ADMIN — AMOXICILLIN AND CLAVULANATE POTASSIUM 1 TABLET: 875; 125 TABLET, FILM COATED ORAL at 20:05

## 2017-10-30 RX ADMIN — POTASSIUM CHLORIDE 20 MEQ: 20 TABLET, EXTENDED RELEASE ORAL at 08:39

## 2017-10-30 RX ADMIN — DOCUSATE SODIUM 100 MG: 100 CAPSULE, LIQUID FILLED ORAL at 20:05

## 2017-10-30 RX ADMIN — CALCIUM CARBONATE (ANTACID) CHEW TAB 500 MG 500 MG: 500 CHEW TAB at 14:46

## 2017-10-30 ASSESSMENT — PAIN SCALES - GENERAL
PAINLEVEL_OUTOF10: 10
PAINLEVEL_OUTOF10: 10
PAINLEVEL_OUTOF10: 4

## 2017-10-30 NOTE — PROGRESS NOTES
[facility]      XR TIBIA FIBULA LEFT (2 VIEWS) [[id number]] Resulted: 10/25/17 0814      Order Status: Completed Updated: 10/25/17 0716     Narrative:       Examination. XR TIBIA FIBULA LEFT STANDARD  History: Cellulitis. The frontal and lateral views of the left tibia and fibula are  obtained. There is no previous study for comparison. This study is of very limited diagnostic value due to motion  artifacts. There is no evidence of recent fracture. No focal bony erosion. There is marked soft tissue swelling of the entire visualized lower  leg. The visualized knee joint and ankle show a mild chronic  osteoarthritis.     Impression:       No acute bony abnormality. Marked soft tissue swelling of the lower leg. Objective:   Vitals: /68   Pulse 84   Temp 97.3 °F (36.3 °C) (Temporal)   Resp 20   Ht 5' 11\" (1.803 m)   Wt (!) 452 lb 7 oz (205.2 kg)   SpO2 95%   BMI 63.10 kg/m²   General appearance: No apparent distress, appears stated age and cooperative. HEENT: Normal cephalic, atraumatic without obvious deformity. Pupils equal, round, and reactive to light. Extra ocular muscles intact. Conjunctivae/corneas clear. Neck: Supple, with full range of motion. No jugular venous distention. Trachea midline. No thyroid tenderness. No masses palpated. Respiratory:  Normal respiratory effort. Clear to auscultation, bilaterally without Rales/Wheezes/Rhonchi. Cardiovascular:  Irregular and tachycardic with normal S1/S2 without murmurs, rubs or gallops. Abdomen: Soft, non-tender, non-distended with normal bowel sounds. No hepatosplenomegaly. Morbidly obesity. Musculoskeletal: No clubbing, cyanosis. 2-3+ edema LLE, 4+ RLE with erythema and warmth, both improving. Skin: \"Wound:  LLE edematous and red, no open areas at this time, no drainage, unable to obtain wound culture at this time. Noted dry whitish cream color plaque area to anterior and posterior calf.  RLE half the size of left, small reddish area RLE distal calf. Aright and left heels dry and scaly, intact. Heels floating off the bed with pillows. Patient able to turn with minimal assist, back side is clear, buttocks and coccyx intact. Scrotum enlarged, groin and scrotal folds moist and reddened. \" per wound care nurse, unable to roll patient today. Neurologic:  Neurovascularly intact without any focal sensory/motor deficits. Cranial nerves: II-XII intact, grossly non-focal.  Psychiatric: Alert and oriented, thought content appropriate, normal insight      Assessment & Plan: Active Hospital Problems    Diagnosis Date Noted    Hypokalemia [E87.6]      Priority: High    Persistent atrial fibrillation (HCC) [I48.1]      Priority: High    Lymphedema of both lower extremities [I89.0] 10/25/2017    Morbid obesity ([facility] [address] [E66.01] 10/25/2017    Cellulitis of left lower extremity [L03.116] 10/25/2017    Hypophosphatemia [E83.39] 10/25/2017    [facility] [G40.909] 10/25/2017     Continue amoxicillin and doxycycline based on cultures. Wound care. ACE wraps to bilateral LE. Bumex 1 mg BID. Continue flomax. Fluid restriction. Margie Rothman, will try percocet. Protonix daily, TUMS PRN. Replete potassium. Repeat labs in am.  Appreciate cardiology assistance which is following for a fib. Increase activity as tolerated. See all orders. Further recommendations per clinical course. PT/OT, placement for rehab.         Chong Herman DO

## 2017-10-30 NOTE — PROGRESS NOTES
Patient continues to refuse ace wraps. Patient stated his pain medicine is not helping. Patient also complaining of acid reflux. Dr. Armando Rossi notified.

## 2017-10-30 NOTE — PROGRESS NOTES
PANEL:No results found for: HDL, LDLDIRECT, LDLCALC, TRIG  LIVER PROFILE:No results for input(s): AST, ALT, LABALBU in the last 72 hours. NURSE:  Beverly Solis RN     Marquita Trujillo : , Male, 81 y.o. History:  Persistent atrial fibrillation of uncertain duration, left lower extremity cellulitis, morbid obesity and suspected sleep apnea and obesity hypoventilation. Pulmonary hypertension. Nursing note and diagnostics above reviewed and evaluated    [Allergies/Contraindications:  Sulfa antibiotics]     Todays status: No feeling of palpitations, no chest pain or dyspnea. Physical Examination    Respiratory -  diminished breath sounds bilaterally . Cardiovascular   irregular rhythm with controlled rate  Abdominal -  Soft. Bowel sounds present. Nontender. Extremities -  Less left lower extremity edema and erythema. .              Assessment/Plan     Continue anticoagulation and beta blockers for rate control. I suspect that his atrial fibrillation will be refractory to cardioversion attempts and may well turn out to be chronic. We will follow him on an outpatient basis and consider making an effort to get him in rhythm in about a month. Discussed with patient and nursing      CLeny Nickerson MD  [facility]

## 2017-10-31 PROBLEM — Z86.79 HISTORY OF CEREBELLAR HEMORRHAGE: Chronic | Status: ACTIVE | Noted: 2017-10-31

## 2017-10-31 PROBLEM — I89.0 LYMPHEDEMA OF BOTH LOWER EXTREMITIES: Chronic | Status: ACTIVE | Noted: 2017-10-25

## 2017-10-31 PROBLEM — E66.01 MORBID OBESITY DUE TO EXCESS CALORIES (HCC): Chronic | Status: ACTIVE | Noted: 2017-10-25

## 2017-10-31 LAB
ANION GAP SERPL CALCULATED.3IONS-SCNC: 8 MMOL/L (ref 7–19)
BASOPHILS ABSOLUTE: 0.1 K/UL (ref 0–0.2)
BASOPHILS RELATIVE PERCENT: 0.4 % (ref 0–1)
BUN BLDV-MCNC: 14 MG/DL (ref 8–23)
CALCIUM SERPL-MCNC: 7.9 MG/DL (ref 8.8–10.2)
CHLORIDE BLD-SCNC: 99 MMOL/L (ref 98–111)
CO2: 32 MMOL/L (ref 22–29)
CREAT SERPL-MCNC: 0.5 MG/DL (ref 0.5–1.2)
EOSINOPHILS ABSOLUTE: 0.1 K/UL (ref 0–0.6)
EOSINOPHILS RELATIVE PERCENT: 1 % (ref 0–5)
GFR NON-AFRICAN AMERICAN: >60
GLUCOSE BLD-MCNC: 102 MG/DL (ref 74–109)
HCT VFR BLD CALC: 36.5 % (ref 42–52)
HEMOGLOBIN: 11.5 G/DL (ref 14–18)
LYMPHOCYTES ABSOLUTE: 2.2 K/UL (ref 1.1–4.5)
LYMPHOCYTES RELATIVE PERCENT: 17.3 % (ref 20–40)
MCH RBC QN AUTO: 31.5 PG (ref 27–31)
MCHC RBC AUTO-ENTMCNC: 31.5 G/DL (ref 33–37)
MCV RBC AUTO: 100 FL (ref 80–94)
MONOCYTES ABSOLUTE: 0.8 K/UL (ref 0–0.9)
MONOCYTES RELATIVE PERCENT: 6.7 % (ref 0–10)
NEUTROPHILS ABSOLUTE: 9.3 K/UL (ref 1.5–7.5)
NEUTROPHILS RELATIVE PERCENT: 73.6 % (ref 50–65)
PDW BLD-RTO: 14.2 % (ref 11.5–14.5)
PLATELET # BLD: 410 K/UL (ref 130–400)
PMV BLD AUTO: 9.5 FL (ref 9.4–12.4)
POTASSIUM SERPL-SCNC: 3.7 MMOL/L (ref 3.5–5)
RBC # BLD: 3.65 M/UL (ref 4.7–6.1)
SODIUM BLD-SCNC: 139 MMOL/L (ref 136–145)
WBC # BLD: 12.6 K/UL (ref 4.8–10.8)

## 2017-10-31 PROCEDURE — 6370000000 HC RX 637 (ALT 250 FOR IP): Performed by: HOSPITALIST

## 2017-10-31 PROCEDURE — 2500000003 HC RX 250 WO HCPCS: Performed by: INTERNAL MEDICINE

## 2017-10-31 PROCEDURE — 6370000000 HC RX 637 (ALT 250 FOR IP): Performed by: INTERNAL MEDICINE

## 2017-10-31 PROCEDURE — 99231 SBSQ HOSP IP/OBS SF/LOW 25: CPT | Performed by: INTERNAL MEDICINE

## 2017-10-31 PROCEDURE — 85025 COMPLETE CBC W/AUTO DIFF WBC: CPT

## 2017-10-31 PROCEDURE — 36415 COLL VENOUS BLD VENIPUNCTURE: CPT

## 2017-10-31 PROCEDURE — 1210000000 HC MED SURG R&B

## 2017-10-31 PROCEDURE — 80048 BASIC METABOLIC PNL TOTAL CA: CPT

## 2017-10-31 RX ADMIN — BUMETANIDE 1 MG: 0.25 INJECTION INTRAMUSCULAR; INTRAVENOUS at 08:13

## 2017-10-31 RX ADMIN — DABIGATRAN ETEXILATE MESYLATE 150 MG: 150 CAPSULE ORAL at 08:13

## 2017-10-31 RX ADMIN — CALCIUM CARBONATE (ANTACID) CHEW TAB 500 MG 500 MG: 500 CHEW TAB at 22:38

## 2017-10-31 RX ADMIN — DOXYCYCLINE HYCLATE 100 MG: 100 CAPSULE, GELATIN COATED ORAL at 08:13

## 2017-10-31 RX ADMIN — OXYCODONE HYDROCHLORIDE AND ACETAMINOPHEN 1 TABLET: 7.5; 325 TABLET ORAL at 08:19

## 2017-10-31 RX ADMIN — DABIGATRAN ETEXILATE MESYLATE 150 MG: 150 CAPSULE ORAL at 20:21

## 2017-10-31 RX ADMIN — POTASSIUM CHLORIDE 20 MEQ: 20 TABLET, EXTENDED RELEASE ORAL at 08:13

## 2017-10-31 RX ADMIN — METOPROLOL TARTRATE 100 MG: 50 TABLET ORAL at 20:21

## 2017-10-31 RX ADMIN — PHENYTOIN SODIUM 200 MG: 100 CAPSULE ORAL at 08:13

## 2017-10-31 RX ADMIN — Medication: at 20:20

## 2017-10-31 RX ADMIN — DOCUSATE SODIUM 100 MG: 100 CAPSULE, LIQUID FILLED ORAL at 08:13

## 2017-10-31 RX ADMIN — OXYCODONE HYDROCHLORIDE AND ACETAMINOPHEN 1 TABLET: 7.5; 325 TABLET ORAL at 15:17

## 2017-10-31 RX ADMIN — POTASSIUM CHLORIDE 20 MEQ: 20 TABLET, EXTENDED RELEASE ORAL at 20:20

## 2017-10-31 RX ADMIN — METOPROLOL TARTRATE 100 MG: 50 TABLET ORAL at 08:13

## 2017-10-31 RX ADMIN — PANTOPRAZOLE SODIUM 40 MG: 40 TABLET, DELAYED RELEASE ORAL at 05:11

## 2017-10-31 RX ADMIN — PHENYTOIN SODIUM 200 MG: 100 CAPSULE ORAL at 20:20

## 2017-10-31 RX ADMIN — FINASTERIDE 5 MG: 5 TABLET, FILM COATED ORAL at 08:13

## 2017-10-31 RX ADMIN — PHENYTOIN SODIUM 200 MG: 100 CAPSULE ORAL at 13:10

## 2017-10-31 RX ADMIN — DOXYCYCLINE HYCLATE 100 MG: 100 CAPSULE, GELATIN COATED ORAL at 20:20

## 2017-10-31 RX ADMIN — AMOXICILLIN AND CLAVULANATE POTASSIUM 1 TABLET: 875; 125 TABLET, FILM COATED ORAL at 08:13

## 2017-10-31 RX ADMIN — AMOXICILLIN AND CLAVULANATE POTASSIUM 1 TABLET: 875; 125 TABLET, FILM COATED ORAL at 20:21

## 2017-10-31 RX ADMIN — DOCUSATE SODIUM 100 MG: 100 CAPSULE, LIQUID FILLED ORAL at 20:20

## 2017-10-31 RX ADMIN — CALCIUM CARBONATE (ANTACID) CHEW TAB 500 MG 500 MG: 500 CHEW TAB at 08:41

## 2017-10-31 RX ADMIN — Medication: at 08:12

## 2017-10-31 RX ADMIN — BUMETANIDE 1 MG: 0.25 INJECTION INTRAMUSCULAR; INTRAVENOUS at 21:40

## 2017-10-31 ASSESSMENT — PAIN SCALES - GENERAL
PAINLEVEL_OUTOF10: 10
PAINLEVEL_OUTOF10: 7

## 2017-10-31 NOTE — PROGRESS NOTES
[facility]  Progress Note                            Date:  10/31/2017  Patient: Dwaine James  Admission:  10/25/2017 12:35 AM  Admit DX: Cellulitis [L03.90]  Age:  81 y.o., 1945     LOS: 6 days     Reason for evaluation:   atrial fibrillation uncertain duration      SUBJECTIVE:    The patient was seen and examined. Notes and labs reviewed. There were not complications over night. Patient's cardiac review of systems: positive for irregular heart beat. The patient is unchanged. awaitng rehab consult. OBJECTIVE:    Telemetry: Atrial fibrillation       pantoprazole  40 mg Oral QAM AC    HYDROCERIN   Topical BID    doxycycline hyclate  100 mg Oral 2 times per day    amoxicillin-clavulanate  1 tablet Oral 2 times per day    metoprolol tartrate  100 mg Oral BID    bumetanide  1 mg Intravenous BID    finasteride  5 mg Oral Daily    potassium chloride  20 mEq Oral BID    docusate sodium  100 mg Oral BID    phenytoin  200 mg Oral TID    dabigatran  150 mg Oral BID                /81   Pulse 95   Temp 97.8 °F (36.6 °C) (Temporal)   Resp 22   Ht 5' 11\" (1.803 m)   Wt (!) 449 lb 7 oz (203.9 kg)   SpO2 93%   BMI 62.68 kg/m²     Intake/Output Summary (Last 24 hours) at 10/31/17 0934  Last data filed at 10/31/17 0906   Gross per 24 hour   Intake          3455.97 ml   Output             2000 ml   Net          1455.97 ml           Labs:   CBC:   Recent Labs      10/30/17   0230  10/31/17   0344   WBC  13.6*  12.6*   HGB  12.2*  11.5*   HCT  39.0*  36.5*   PLT  397  410*     BMP: Recent Labs      10/30/17   0230  10/31/17   0344   NA  140  139   K  4.3  3.7   CO2  36*  32*   BUN  11  14   CREATININE  0.6  0.5   LABGLOM  >60  >60   GLUCOSE  103  102     BNP: No results for input(s): BNP in the last 72 hours. PT/INR: No results for input(s): PROTIME, INR in the last 72 hours. APTT:No results for input(s): APTT in the last 72 hours.   CARDIAC ENZYMES:No results for

## 2017-11-01 LAB
ANION GAP SERPL CALCULATED.3IONS-SCNC: 9 MMOL/L (ref 7–19)
BUN BLDV-MCNC: 14 MG/DL (ref 8–23)
CALCIUM SERPL-MCNC: 8.2 MG/DL (ref 8.8–10.2)
CHLORIDE BLD-SCNC: 98 MMOL/L (ref 98–111)
CO2: 34 MMOL/L (ref 22–29)
CREAT SERPL-MCNC: 0.6 MG/DL (ref 0.5–1.2)
EKG P AXIS: NORMAL DEGREES
EKG P-R INTERVAL: NORMAL MS
EKG Q-T INTERVAL: 390 MS
EKG QRS DURATION: 80 MS
EKG QTC CALCULATION (BAZETT): 430 MS
EKG T AXIS: 45 DEGREES
GFR NON-AFRICAN AMERICAN: >60
GLUCOSE BLD-MCNC: 106 MG/DL (ref 74–109)
HCT VFR BLD CALC: 37.5 % (ref 42–52)
HEMOGLOBIN: 12 G/DL (ref 14–18)
MCH RBC QN AUTO: 31.7 PG (ref 27–31)
MCHC RBC AUTO-ENTMCNC: 32 G/DL (ref 33–37)
MCV RBC AUTO: 98.9 FL (ref 80–94)
PDW BLD-RTO: 14.3 % (ref 11.5–14.5)
PLATELET # BLD: 483 K/UL (ref 130–400)
PMV BLD AUTO: 8.9 FL (ref 9.4–12.4)
POTASSIUM SERPL-SCNC: 3.7 MMOL/L (ref 3.5–5)
RBC # BLD: 3.79 M/UL (ref 4.7–6.1)
SODIUM BLD-SCNC: 141 MMOL/L (ref 136–145)
WBC # BLD: 12.3 K/UL (ref 4.8–10.8)

## 2017-11-01 PROCEDURE — 97161 PT EVAL LOW COMPLEX 20 MIN: CPT

## 2017-11-01 PROCEDURE — 97535 SELF CARE MNGMENT TRAINING: CPT

## 2017-11-01 PROCEDURE — 94762 N-INVAS EAR/PLS OXIMTRY CONT: CPT

## 2017-11-01 PROCEDURE — 2500000003 HC RX 250 WO HCPCS: Performed by: INTERNAL MEDICINE

## 2017-11-01 PROCEDURE — 6370000000 HC RX 637 (ALT 250 FOR IP): Performed by: HOSPITALIST

## 2017-11-01 PROCEDURE — 36415 COLL VENOUS BLD VENIPUNCTURE: CPT

## 2017-11-01 PROCEDURE — G8987 SELF CARE CURRENT STATUS: HCPCS

## 2017-11-01 PROCEDURE — 85027 COMPLETE CBC AUTOMATED: CPT

## 2017-11-01 PROCEDURE — G8978 MOBILITY CURRENT STATUS: HCPCS

## 2017-11-01 PROCEDURE — 93005 ELECTROCARDIOGRAM TRACING: CPT

## 2017-11-01 PROCEDURE — 80048 BASIC METABOLIC PNL TOTAL CA: CPT

## 2017-11-01 PROCEDURE — 6370000000 HC RX 637 (ALT 250 FOR IP): Performed by: INTERNAL MEDICINE

## 2017-11-01 PROCEDURE — 97165 OT EVAL LOW COMPLEX 30 MIN: CPT

## 2017-11-01 PROCEDURE — 99231 SBSQ HOSP IP/OBS SF/LOW 25: CPT | Performed by: HOSPITALIST

## 2017-11-01 PROCEDURE — G8988 SELF CARE GOAL STATUS: HCPCS

## 2017-11-01 PROCEDURE — 99232 SBSQ HOSP IP/OBS MODERATE 35: CPT | Performed by: INTERNAL MEDICINE

## 2017-11-01 PROCEDURE — 97530 THERAPEUTIC ACTIVITIES: CPT

## 2017-11-01 PROCEDURE — 1210000000 HC MED SURG R&B

## 2017-11-01 PROCEDURE — G8979 MOBILITY GOAL STATUS: HCPCS

## 2017-11-01 RX ADMIN — AMOXICILLIN AND CLAVULANATE POTASSIUM 1 TABLET: 875; 125 TABLET, FILM COATED ORAL at 21:43

## 2017-11-01 RX ADMIN — POTASSIUM CHLORIDE 20 MEQ: 20 TABLET, EXTENDED RELEASE ORAL at 08:56

## 2017-11-01 RX ADMIN — DOXYCYCLINE HYCLATE 100 MG: 100 CAPSULE, GELATIN COATED ORAL at 21:44

## 2017-11-01 RX ADMIN — FINASTERIDE 5 MG: 5 TABLET, FILM COATED ORAL at 08:55

## 2017-11-01 RX ADMIN — AMOXICILLIN AND CLAVULANATE POTASSIUM 1 TABLET: 875; 125 TABLET, FILM COATED ORAL at 08:55

## 2017-11-01 RX ADMIN — CALCIUM CARBONATE (ANTACID) CHEW TAB 500 MG 500 MG: 500 CHEW TAB at 12:58

## 2017-11-01 RX ADMIN — PHENYTOIN SODIUM 200 MG: 100 CAPSULE ORAL at 08:55

## 2017-11-01 RX ADMIN — PHENYTOIN SODIUM 200 MG: 100 CAPSULE ORAL at 21:44

## 2017-11-01 RX ADMIN — BUMETANIDE 1 MG: 0.25 INJECTION INTRAMUSCULAR; INTRAVENOUS at 22:05

## 2017-11-01 RX ADMIN — DABIGATRAN ETEXILATE MESYLATE 150 MG: 150 CAPSULE ORAL at 08:55

## 2017-11-01 RX ADMIN — DOCUSATE SODIUM 100 MG: 100 CAPSULE, LIQUID FILLED ORAL at 21:44

## 2017-11-01 RX ADMIN — DOXYCYCLINE HYCLATE 100 MG: 100 CAPSULE, GELATIN COATED ORAL at 08:55

## 2017-11-01 RX ADMIN — PANTOPRAZOLE SODIUM 40 MG: 40 TABLET, DELAYED RELEASE ORAL at 05:46

## 2017-11-01 RX ADMIN — METOPROLOL TARTRATE 100 MG: 50 TABLET ORAL at 21:44

## 2017-11-01 RX ADMIN — BUMETANIDE 1 MG: 0.25 INJECTION INTRAMUSCULAR; INTRAVENOUS at 12:53

## 2017-11-01 RX ADMIN — DABIGATRAN ETEXILATE MESYLATE 150 MG: 150 CAPSULE ORAL at 21:44

## 2017-11-01 RX ADMIN — Medication: at 12:59

## 2017-11-01 RX ADMIN — PHENYTOIN SODIUM 200 MG: 100 CAPSULE ORAL at 17:54

## 2017-11-01 RX ADMIN — METOPROLOL TARTRATE 100 MG: 50 TABLET ORAL at 08:55

## 2017-11-01 RX ADMIN — POTASSIUM CHLORIDE 20 MEQ: 20 TABLET, EXTENDED RELEASE ORAL at 21:44

## 2017-11-01 NOTE — PROGRESS NOTES
Occupational Therapy   Occupational Therapy Initial Assessment  Date: 2017   Patient Name: Dottie Solomon  MRN: [id number]     : 1945    Patient Diagnosis(es): The primary encounter diagnosis was Cellulitis of left lower extremity. Diagnoses of Sepsis, due to unspecified organism [facility]), Contusion of thoracic wall, unspecified area of thoracic wall, initial encounter, Morbid obesity ([facility] [address] Hypokalemia, and Left leg pain were also pertinent to this visit. has a past medical history of Anxiety; Cerebral hemorrhage ([facility] [address] and Hypertension. has a past surgical history that includes Tonsillectomy. Treatment Diagnosis: Cellulitis      Restrictions       Subjective   General  Chart Reviewed: Yes  Patient assessed for rehabilitation services?: Yes  Additional Pertinent Hx: Per pt., slid off bed in hotel he was staying at and spent two days on the floor. Cellulitis in LLE  Family / Caregiver Present: Yes  Diagnosis: Cellulitis in LLE  Pain Assessment  Patient Currently in Pain: Denies  Pain Assessment: 0-10  Pain Level: 10  Response to Pain Intervention: Patient Satisfied    Social/Functional History  Social/Functional History  Lives With: Spouse  Type of Home:  (Mostly hotels due to work)  Home Layout: One level  Home Access:  ([address] 3-4 STEPS OCCASIONALLY)  Bathroom Shower/Tub: Tub/Shower unit  Bathroom Toilet: Standard  ADL Assistance: Independent  Ambulation Assistance: Independent  Transfer Assistance: Independent  Active : Yes  Occupation: Full time employment  Additional Comments: Typically uses no A.D. to ambulate at work       Objective   Vision: Within Functional Limits  Hearing: Within functional limits    Orientation  Overall Orientation Status: Within Normal Limits     Standing Balance  Sit to stand: Stand by assistance  ADL  Feeding: Independent  Grooming: Independent  UE Bathing: Supervision  LE Bathing: Minimal assistance  LE Dressing:  Moderate assistance  Toileting: Stand by assistance           Transfers  Stand Step Transfers: Stand by assistance  Sit to stand: Stand by assistance  Transfer Comments: Pt. able to stand -step to [facility] with SBA     Cognition  Overall Cognitive Status: WNL                 LUE AROM (degrees)  LUE AROM : WNL  RUE AROM (degrees)  RUE AROM : WNL  LUE Strength  Gross LUE Strength: WNL  RUE Strength  Gross RUE Strength: WNL                  Assessment   Performance deficits / Impairments: Decreased ADL status; Decreased functional mobility   Assessment: Pt. did well with bed mobility and tfers to [facility]  Treatment Diagnosis: Cellulitis  Prognosis: Good  Decision Making: Low Complexity  REQUIRES OT FOLLOW UP: Yes  Activity Tolerance  Activity Tolerance: Patient Tolerated treatment well        Discharge Recommendations:        Plan   Plan  Times per week: 3-5x/week    G-Code  OT G-codes  Functional Assessment Tool Used: ADLs and transfers  Score: CK  Functional Limitation: Self care  Self Care Current Status (): At least 40 percent but less than 60 percent impaired, limited or restricted  Self Care Goal Status (): At least 1 percent but less than 20 percent impaired, limited or restricted  OutComes Score                                           Goals  Short term goals  Time Frame for Short term goals: 1 week  Short term goal 1: Modified I with toilet tfers  Short term goal 2: Modified I with light ambulatory ADLs in room  Short term goal 3: Modified I with clothing mgmt. Short term goal 4: Supervision with seated LB dsg.   Long term goals  Long term goal 1: Return to PLOF       Therapy Time   Individual Concurrent Group Co-treatment   Time In           Time Out           Minutes                   Fernie Mcnally OTR/L

## 2017-11-01 NOTE — PROGRESS NOTES
Occupational Therapy   Occupational Therapy Initial Assessment  Date: 2017   Patient Name: Eugenia Stpales  MRN: [id number]     : 1945    Patient Diagnosis(es): The primary encounter diagnosis was Cellulitis of left lower extremity. Diagnoses of Sepsis, due to unspecified organism [facility]), Contusion of thoracic wall, unspecified area of thoracic wall, initial encounter, Morbid obesity ([facility] [address] Hypokalemia, and Left leg pain were also pertinent to this visit. has a past medical history of Anxiety; Cerebral hemorrhage ([facility] [address] and Hypertension. has a past surgical history that includes Tonsillectomy. Treatment Diagnosis: Cellulitis      Restrictions       Subjective   General  Chart Reviewed: Yes  Patient assessed for rehabilitation services?: Yes  Additional Pertinent Hx: Per pt., slid off bed in hotel he was staying at and spent two days on the floor. Cellulitis in LLE  Family / Caregiver Present: Yes  Diagnosis: Cellulitis in LLE  General Comment  Comments: Pt. felt he needed to get on BSC.   Pain Assessment  Patient Currently in Pain: Denies  Pain Assessment: 0-10  Pain Level: 10  Response to Pain Intervention: Patient Satisfied    Social/Functional History  Social/Functional History  Lives With: Spouse  Type of Home:  (Mostly hotels due to work)  Home Layout: One level  Home Access:  ([address] 3-4 STEPS OCCASIONALLY)  Bathroom Shower/Tub: Tub/Shower unit  Bathroom Toilet: Standard  ADL Assistance: Independent  Ambulation Assistance: Independent  Transfer Assistance: Independent  Active : Yes  Occupation: Full time employment  Additional Comments: Typically uses no A.D. to ambulate at work       Objective   Vision: Within Functional Limits  Hearing: Within functional limits    Orientation  Overall Orientation Status: Within Normal Limits     Standing Balance  Sit to stand: Stand by assistance  ADL  Feeding: Independent  Grooming: Independent  UE Bathing: Supervision  LE Bathing: Minimal assistance  LE Dressing: Moderate assistance  Toileting: Stand by assistance           Transfers  Stand Step Transfers: Stand by assistance  Sit to stand: Stand by assistance  Transfer Comments: Pt. able to stand -step to [facility] with SBA     Cognition  Overall Cognitive Status: WNL                 LUE AROM (degrees)  LUE AROM : WNL  RUE AROM (degrees)  RUE AROM : WNL  LUE Strength  Gross LUE Strength: WNL  RUE Strength  Gross RUE Strength: WNL                  Assessment   Performance deficits / Impairments: Decreased ADL status; Decreased functional mobility   Assessment: Pt. simulated clothing mgmt with SBA. Treatment Diagnosis: Cellulitis  Prognosis: Good  Decision Making: Low Complexity  REQUIRES OT FOLLOW UP: Yes  Activity Tolerance  Activity Tolerance: Patient Tolerated treatment well        Discharge Recommendations:        Plan   Plan  Times per week: 3-5x/week    G-Code  OT G-codes  Functional Assessment Tool Used: ADLs and transfers  Score: CK  Functional Limitation: Self care  Self Care Current Status (): At least 40 percent but less than 60 percent impaired, limited or restricted  Self Care Goal Status (): At least 1 percent but less than 20 percent impaired, limited or restricted  OutComes Score                                           Goals  Short term goals  Time Frame for Short term goals: 1 week  Short term goal 1: Modified I with toilet tfers  Short term goal 2: Modified I with light ambulatory ADLs in room  Short term goal 3: Modified I with clothing mgmt. Short term goal 4: Supervision with seated LB dsg.   Long term goals  Long term goal 1: Return to PLOF       Therapy Time   Individual Concurrent Group Co-treatment   Time In           Time Out           Minutes                   SVETA Mott/CHERELLE

## 2017-11-02 VITALS
SYSTOLIC BLOOD PRESSURE: 119 MMHG | WEIGHT: 315 LBS | HEIGHT: 71 IN | RESPIRATION RATE: 20 BRPM | OXYGEN SATURATION: 94 % | DIASTOLIC BLOOD PRESSURE: 71 MMHG | HEART RATE: 84 BPM | TEMPERATURE: 99.6 F | BODY MASS INDEX: 44.1 KG/M2

## 2017-11-02 LAB — PHENYTOIN LEVEL: 5.5 UG/ML (ref 10–20)

## 2017-11-02 PROCEDURE — 6370000000 HC RX 637 (ALT 250 FOR IP): Performed by: HOSPITALIST

## 2017-11-02 PROCEDURE — 2500000003 HC RX 250 WO HCPCS: Performed by: INTERNAL MEDICINE

## 2017-11-02 PROCEDURE — 36415 COLL VENOUS BLD VENIPUNCTURE: CPT

## 2017-11-02 PROCEDURE — 97116 GAIT TRAINING THERAPY: CPT

## 2017-11-02 PROCEDURE — 6370000000 HC RX 637 (ALT 250 FOR IP): Performed by: INTERNAL MEDICINE

## 2017-11-02 PROCEDURE — 99239 HOSP IP/OBS DSCHRG MGMT >30: CPT | Performed by: HOSPITALIST

## 2017-11-02 PROCEDURE — 99232 SBSQ HOSP IP/OBS MODERATE 35: CPT | Performed by: NURSE PRACTITIONER

## 2017-11-02 PROCEDURE — 97530 THERAPEUTIC ACTIVITIES: CPT

## 2017-11-02 PROCEDURE — 99238 HOSP IP/OBS DSCHRG MGMT 30/<: CPT | Performed by: HOSPITALIST

## 2017-11-02 PROCEDURE — 80185 ASSAY OF PHENYTOIN TOTAL: CPT

## 2017-11-02 RX ORDER — DABIGATRAN ETEXILATE 150 MG/1
150 CAPSULE, COATED PELLETS ORAL 2 TIMES DAILY
Qty: 60 CAPSULE | Refills: 0
Start: 2017-11-02 | End: 2017-12-05 | Stop reason: ALTCHOICE

## 2017-11-02 RX ORDER — FINASTERIDE 5 MG/1
5 TABLET, FILM COATED ORAL DAILY
Qty: 30 TABLET | Refills: 0
Start: 2017-11-03

## 2017-11-02 RX ORDER — METOPROLOL TARTRATE 100 MG/1
100 TABLET ORAL 2 TIMES DAILY
Qty: 60 TABLET | Refills: 0
Start: 2017-11-02 | End: 2018-01-08 | Stop reason: SDUPTHER

## 2017-11-02 RX ORDER — LANOLIN ALCOHOL/MO/W.PET/CERES
CREAM (GRAM) TOPICAL 2 TIMES DAILY
Status: DISCONTINUED | OUTPATIENT
Start: 2017-11-02 | End: 2017-11-02 | Stop reason: HOSPADM

## 2017-11-02 RX ORDER — BUMETANIDE 2 MG/1
1 TABLET ORAL DAILY
Qty: 30 TABLET | Refills: 0
Start: 2017-11-02 | End: 2018-02-19 | Stop reason: DRUGHIGH

## 2017-11-02 RX ORDER — LANOLIN ALCOHOL/MO/W.PET/CERES
CREAM (GRAM) TOPICAL 2 TIMES DAILY
Status: DISCONTINUED | OUTPATIENT
Start: 2017-11-02 | End: 2017-11-02 | Stop reason: SDUPTHER

## 2017-11-02 RX ORDER — AMOXICILLIN AND CLAVULANATE POTASSIUM 875; 125 MG/1; MG/1
1 TABLET, FILM COATED ORAL EVERY 12 HOURS SCHEDULED
Qty: 20 TABLET | Refills: 0
Start: 2017-11-02 | End: 2017-11-12

## 2017-11-02 RX ORDER — DIAZEPAM 5 MG/1
5 TABLET ORAL EVERY 12 HOURS PRN
Qty: 6 TABLET | Refills: 0 | Status: SHIPPED | OUTPATIENT
Start: 2017-11-02 | End: 2017-11-12

## 2017-11-02 RX ORDER — DOXYCYCLINE HYCLATE 100 MG/1
100 CAPSULE ORAL EVERY 12 HOURS SCHEDULED
Qty: 20 CAPSULE | Refills: 0
Start: 2017-11-02 | End: 2017-11-12

## 2017-11-02 RX ADMIN — PANTOPRAZOLE SODIUM 40 MG: 40 TABLET, DELAYED RELEASE ORAL at 06:13

## 2017-11-02 RX ADMIN — FINASTERIDE 5 MG: 5 TABLET, FILM COATED ORAL at 09:17

## 2017-11-02 RX ADMIN — DABIGATRAN ETEXILATE MESYLATE 150 MG: 150 CAPSULE ORAL at 09:18

## 2017-11-02 RX ADMIN — PHENYTOIN SODIUM 200 MG: 100 CAPSULE ORAL at 16:00

## 2017-11-02 RX ADMIN — DOCUSATE SODIUM 100 MG: 100 CAPSULE, LIQUID FILLED ORAL at 09:33

## 2017-11-02 RX ADMIN — BUMETANIDE 1 MG: 0.25 INJECTION INTRAMUSCULAR; INTRAVENOUS at 09:49

## 2017-11-02 RX ADMIN — AMOXICILLIN AND CLAVULANATE POTASSIUM 1 TABLET: 875; 125 TABLET, FILM COATED ORAL at 09:17

## 2017-11-02 RX ADMIN — Medication: at 09:37

## 2017-11-02 RX ADMIN — PHENYTOIN SODIUM 200 MG: 100 CAPSULE ORAL at 09:18

## 2017-11-02 RX ADMIN — METOPROLOL TARTRATE 100 MG: 50 TABLET ORAL at 09:16

## 2017-11-02 RX ADMIN — POTASSIUM CHLORIDE 20 MEQ: 20 TABLET, EXTENDED RELEASE ORAL at 09:16

## 2017-11-02 RX ADMIN — DOXYCYCLINE HYCLATE 100 MG: 100 CAPSULE, GELATIN COATED ORAL at 09:18

## 2017-11-02 NOTE — PROGRESS NOTES
Patient:  Lissa Mohr  YOB: 1945  Date of Service: 11/2/2017  MRN: [id number]   Acct: [de-identified]   Primary Care Physician: No primary care provider on file. Advance Directive: Full Code  Admit Date: 10/25/2017       Hospital Day: 8        SUBJECTIVE:    No new complaints today      Objective:   VITALS:  /71   Pulse 84   Temp 99.6 °F (37.6 °C) (Temporal)   Resp 20   Ht 5' 11\" (1.803 m)   Wt (!) 459 lb 6 oz (208.4 kg)   SpO2 94%   BMI 64.07 kg/m²   24HR INTAKE/OUTPUT:      Intake/Output Summary (Last 24 hours) at 11/02/17 0757  Last data filed at 11/02/17 0049   Gross per 24 hour   Intake             1620 ml   Output              500 ml   Net             1120 ml       General appearance: No acute distress  HEENT: Normocephalic, atraumatic, no pallor or icterus  Lungs: clear to auscultation bilaterally,no rales or wheezes   Heart: regular rate and rhythm, S1, S2 normal, no murmur  Abdomen:soft, non-tender; non-distended, normal bowel sounds  MSK/Skin: Left lower extremity erythemia.   Heme:Peripheral IV        Medications:   Prescriptions Prior to Admission: Phenytoin (DILANTIN PO), Take 200 mg by mouth 3 times daily   hydrochlorothiazide (HYDRODIURIL) 50 MG tablet, Take 50 mg by mouth daily  potassium chloride (KLOR-CON M) 10 MEQ extended release tablet, Take 10 mEq by mouth daily  diazepam (VALIUM) 10 MG tablet, Take 10 mg by mouth every 12 hours as needed for Anxiety      pantoprazole  40 mg Oral QAM AC    HYDROCERIN   Topical BID    doxycycline hyclate  100 mg Oral 2 times per day    amoxicillin-clavulanate  1 tablet Oral 2 times per day    metoprolol tartrate  100 mg Oral BID    bumetanide  1 mg Intravenous BID    finasteride  5 mg Oral Daily    potassium chloride  20 mEq Oral BID    docusate sodium  100 mg Oral BID    phenytoin  200 mg Oral TID    dabigatran  150 mg Oral BID     oxyCODONE-acetaminophen, calcium carbonate, potassium chloride, acetaminophen, bisacodyl, ondansetron, diazepam  Dietary Nutrition Supplements: Low Calorie High Protein Supplement  DIET CARDIAC; Daily Fluid Restriction: 1800 ml     Lab and other Data:     Recent Labs      10/31/17   0344  11/01/17   0327   WBC  12.6*  12.3*   HGB  11.5*  12.0*   PLT  410*  483*     Recent Labs      10/31/17   0344  11/01/17   0327   NA  139  141   K  3.7  3.7   CL  99  98   CO2  32*  34*   BUN  14  14   CREATININE  0.5  0.6   GLUCOSE  102  106         Assessment/Plan   Assessment  Marianne diagnosis  Cellulitis of left lower extremity      Other chronic medical conditions  Persistent atrial fibrillation  Lymphedema of both lower extremities  Morbid obesity   History of cerebellar hemorrhage    Plan  Patient accepted at [facility], will discharge patient today.

## 2017-11-02 NOTE — PROGRESS NOTES
History:   Diagnosis Date    Anxiety     Cerebral hemorrhage ([facility] [address]     Hypertension      Past Surgical History:   Procedure Laterality Date    TONSILLECTOMY         Restrictions  Restrictions/Precautions  Restrictions/Precautions: Fall Risk  Position Activity Restriction  Other position/activity restrictions: (P) SPECIALTY BED  Subjective   General  Chart Reviewed: (P) Yes  Family / Caregiver Present: (P) Yes  Subjective  Subjective: (P) Pt agrees to walk  General Comment  Comments: (P) Patient in bed  Pain Screening  Patient Currently in Pain: (P) No  Vital Signs  Patient Currently in Pain: (P) No       Orientation  Orientation  Overall Orientation Status: (P) Within Normal Limits  Objective   Bed mobility  Scooting: (P) Stand by assistance  Transfers  Sit to Stand: (P) Stand by assistance  Stand to sit: (P) Stand by assistance  Bed to Chair: (P) Stand by assistance  Ambulation  Ambulation?: (P) Yes  Ambulation 1  Surface: (P) level tile  Device: (P) No Device  Other Apparatus: (P)  (Chair follow)  Assistance: (P) Stand by assistance  Quality of Gait: (P) Slow but steady  Distance: (P) 150'  Comments: (P) Patient in chair post gait     Balance  Sitting - Dynamic: (P) Good  Standing - Dynamic: (P) Fair;+                           Assessment   Body structures, Functions, Activity limitations: (P) Decreased functional mobility ; Decreased endurance;Decreased balance;Decreased strength          Discharge Recommendations:  Continue to assess pending progress    G-Code     OutComes Score                                                      Goals  Short term goals  Time Frame for Short term goals: (P) 14 DAYS  Short term goal 1: (P) BED MOB MOD IND  Short term goal 2: (P) TRANSFERS MOD IND  Short term goal 3: (P) ' AAD SUPERVISION    Plan    Plan  Times per week: AT LEAST 6-7  Current Treatment Recommendations: Strengthening, Balance Training, Functional Mobility Training, Transfer Training, Gait Training, Patient/Caregiver Education & Training, Safety Education & Training  Safety Devices  Type of devices: (P) Left in chair (family present)     Therapy Time   Individual Concurrent Group Co-treatment   Time In           Time Out           Minutes                   Lopez Merida PTA     Electronically signed by Lopez Merida PTA on 11/2/2017 at 11:39 AM

## 2017-11-02 NOTE — PROGRESS NOTES
Occupational Therapy  Facility/Department: [facility] 4 ONCOLOGY UNIT  Daily Treatment Note  NAME: Perfecto Forrest  : [id number]  MRN: [id number]    Date of Service: 2017    Patient Diagnosis(es): The primary encounter diagnosis was Cellulitis of left lower extremity. Diagnoses of Sepsis, due to unspecified organism [facility]), Contusion of thoracic wall, unspecified area of thoracic wall, initial encounter, Morbid obesity ([facility] [address] Hypokalemia, and Left leg pain were also pertinent to this visit. has a past medical history of Anxiety; Cerebral hemorrhage ([facility] [address] and Hypertension. has a past surgical history that includes Tonsillectomy. Restrictions  Restrictions/Precautions  Restrictions/Precautions: Fall Risk  Position Activity Restriction  Other position/activity restrictions: SPECIALTY BED  Subjective   General  Chart Reviewed: Yes  Patient assessed for rehabilitation services?: Yes  Additional Pertinent Hx: Per pt., slid off bed in hotel he was staying at and spent two days on the floor. Cellulitis in LLE  Family / Caregiver Present: Yes  Diagnosis: Cellulitis in LLE  General Comment  Comments: Pt. ready to get up and try to walk. Pain Assessment  Patient Currently in Pain: No  Vital Signs  Patient Currently in Pain: No   Orientation     Objective             Standing Balance  Sit to stand: Supervision     Transfers  Stand Step Transfers: Supervision  Sit to stand: Supervision                                          Assessment   Assessment: Pt. able to walk over 200 feet with supervision and no A.D. although went at a slow pace and occasionally held onto rail in the hallway.   Decreased endurance          Discharge Recommendations:        Plan   Plan  Times per week: 3-5x/week  G-Code     OutComes Score                                             Goals  Short term goals  Time Frame for Short term goals: 1 week  Short term goal 1: Modified I with toilet tfers  Short term goal 2: Modified I with light ambulatory

## 2017-11-02 NOTE — PROGRESS NOTES
Nutrition Assessment    Type and Reason for Visit: Reassess, Patient Education    Nutrition Recommendations: pt educated on cardiac diet and probiotic food choices    Malnutrition Assessment:  · Malnutrition Status: No malnutrition  · Context: Acute illness or injury    Nutrition Diagnosis:   · Problem: Increased nutrient needs  · Etiology: related to Increased demand for energy/nutrients due to     Signs and symptoms:  as evidenced by Presence of wounds    Nutrition Assessment:  · Subjective Assessment: Pt educated on cardiac diet and probiotic food choices. Pt & spouse asked many pertinent questions. Educational materials were given, as well as RD contact information. · Wound Type: Open Wounds (cellulitis LLE)  · Current Nutrition Therapies:  · Oral Diet Orders: Cardiac   · Oral Diet intake: 51-75%  · Oral Nutrition Supplement (ONS) Orders: Low Calorie, High Protein      Estimated Intake vs Estimated Needs: Intake Improving    Nutrition Risk Level: Moderate    Nutrition Interventions:   Continue current diet, Continue current ONS  Discharge Planning, Education Completed    Nutrition Evaluation:   · Evaluation: Progressing toward goals   · Goals: po 75% or more, wound improvement    · Monitoring: Meal Intake, Supplement Intake, Wound Healing, Weight, Pertinent Labs, Skin Integrity    See Adult Nutrition Doc Flowsheet for more detail.      Electronically signed by Eduard Perez RD, FILIPE on 11/2/17 at 5:15 PM    Contact Number: [phone]

## 2017-11-02 NOTE — PROGRESS NOTES
hours.  APTT:No results for input(s): APTT in the last 72 hours. CARDIAC ENZYMES:No results for input(s): CKTOTAL, CKMB, CKMBINDEX, TROPONINI in the last 72 hours. FASTING LIPID PANEL:No results found for: HDL, LDLDIRECT, LDLCALC, TRIG  LIVER PROFILE:No results for input(s): AST, ALT, LABALBU in the last 72 hours. NURSE:  DALLIN Bishop : [id number], Male, 81 y.o. History: This is a 81-year-old white male we are seeing for atrial fibrillation of uncertain duration    Nursing note and diagnostics above reviewed and evaluated    [Allergies/Contraindications:  Sulfa antibiotics]     Todays status: The patient describes no chest pain or shortness of breath. He is unaware of his atrial fibrillation    Physical Examination    Respiratory -  lungs are clear . Cardiovascular   the rhythm is irregularly irregular  Abdominal -  Soft. Bowel sounds present. Nontender. Extremities -  patient has 4+ pedal edema. Assessment/Plan     The patient is being discharged to rehab today. He will undergo rate control and anticoagulation. He will be seen in the office for her planned antiarrhythmic therapy and cardioversion    Discussed with patient and nursing      Jammie Johnson M.D.   [facility]

## 2017-11-02 NOTE — DISCHARGE INSTR - DIET
to Facility/ Agency   · Name:   · Address:  · Phone:  · Fax:    Dialysis Facility (if applicable)   · Name:  · Address:  · Dialysis Schedule:  · Phone:  · Fax:    / signature: {Esignature:[id number]}

## 2017-11-02 NOTE — DISCHARGE SUMMARY
Patient ID:  Eugenia Staples  [id number]  5 y.o.  1945    Admit date: 10/25/2017    Discharge date and time: No discharge date for patient encounter. Admitting Physician: Nickolas Garber MD     Discharge Physician: Paris Bae MD    Discharge Diagnoses:   Assessment  Marianne diagnosis  Cellulitis of left lower extremity        Other chronic medical conditions  Persistent atrial fibrillation  Lymphedema of both lower extremities  Morbid obesity   History of cerebellar hemorrhage    Discharged Condition: improved    Disposition: [facility]    Consults: Cardiology    Procedures: None    Significant Diagnostic Studies:   Left leg venous scan completed. No evidence for DVT, SVT, or reflux noted in areas visualized at this time. Limited study due to severe edema. Mid and Distal SFV, Mid and distal LSV, ATV, Silvia V. And most of PTV not visualized at this time. Discharge Exam:  VITALS:  /71   Pulse 84   Temp 99.6 °F (37.6 °C) (Temporal)   Resp 20   Ht 5' 11\" (1.803 m)   Wt (!) 459 lb 6 oz (208.4 kg)   SpO2 94%   BMI 64.07 kg/m²   General appearance: No acute distress  HEENT: Normocephalic, atraumatic, no pallor or icterus  Lungs: clear to auscultation bilaterally,no rales or wheezes   Heart: regular rate and rhythm, S1, S2 normal, no murmur  Abdomen:soft, non-tender; non-distended, normal bowel sounds  MSK/Skin: Left lower extremity erythema improved  Heme:Peripheral IV    Hospital Course:     Patient presented with c/o left lower extremity swelling. Patient was working in [place] staying at a hotel when he slid out of bed onto the floor of his hotel room. Stayed on floor for about 3 days until [facility] realized he had not seen the patient in a couple days.  knocked on his hotel room door and patient was able to yell for help. EMS was summoned and patient states he was checked out and was asked if he wanted treatment there in [place].  Patient declined, called his wife who clindamycin Sensitive <=0.25 mcg/mL   doxycycline Sensitive  mcg/mL   erythromycin Sensitive <=0.25 mcg/mL   inducible clindamycin resistance Negative Neg mcg/mL   oxacillin Sensitive <=0.25 mcg/mL   tetracycline Sensitive <=1 mcg/mL   trimethoprim-sulfamethoxazole Sensitive <=10 mcg/mL   vancomycin Sensitive           Patient changed to Po Augmentin and Doxycyline to be continued for 10 more days    Patient also treated with diuretics for leg edema treatment. Cardiology was asked to see patient for new onset AFIB per EKG. Patient denied any prior history of AFIB. Patient denied any prior history of CAD, CHF or MI. Patient started on anticoagulation  And rate controlling medications.     Patient discharged with the following instruction and medication recommendations          Patient Instructions:   Please follow up with your PCP in 2-4 weeks  Follow up with cardiology in 4-6 weeks  BMP in 1 week  Please take your antibiotics for 10 more days     [facility] Medication Instructions PRL:[id number]    Printed on:11/02/17 3566   Medication Information                      amoxicillin-clavulanate (AUGMENTIN) 875-125 MG per tablet  Take 1 tablet by mouth every 12 hours for 10 days             bumetanide (BUMEX) 2 MG tablet  Take 0.5 tablets by mouth daily             dabigatran (PRADAXA) 150 MG capsule  Take 1 capsule by mouth 2 times daily             diazepam (VALIUM) 5 MG tablet  Take 1 tablet by mouth every 12 hours as needed for Anxiety             doxycycline hyclate (VIBRAMYCIN) 100 MG capsule  Take 1 capsule by mouth every 12 hours for 10 days             finasteride (PROSCAR) 5 MG tablet  Take 1 tablet by mouth daily             metoprolol tartrate (LOPRESSOR) 100 MG tablet  Take 1 tablet by mouth 2 times daily             Phenytoin (DILANTIN PO)  Take 200 mg by mouth 3 times daily              potassium chloride (KLOR-CON M) 10 MEQ extended release tablet  Take 10 mEq by mouth daily

## 2017-12-05 ENCOUNTER — OFFICE VISIT (OUTPATIENT)
Dept: CARDIOLOGY | Age: 72
End: 2017-12-05

## 2017-12-05 VITALS
SYSTOLIC BLOOD PRESSURE: 124 MMHG | HEART RATE: 86 BPM | BODY MASS INDEX: 42.66 KG/M2 | DIASTOLIC BLOOD PRESSURE: 82 MMHG | HEIGHT: 72 IN | WEIGHT: 315 LBS

## 2017-12-05 DIAGNOSIS — Z79.01 CHRONIC ANTICOAGULATION: ICD-10-CM

## 2017-12-05 DIAGNOSIS — E66.01 MORBID OBESITY (HCC): ICD-10-CM

## 2017-12-05 DIAGNOSIS — Z87.898 HISTORY OF SNORING: ICD-10-CM

## 2017-12-05 DIAGNOSIS — R79.89 ELEVATED BRAIN NATRIURETIC PEPTIDE (BNP) LEVEL: ICD-10-CM

## 2017-12-05 DIAGNOSIS — I48.19 PERSISTENT ATRIAL FIBRILLATION (HCC): Primary | Chronic | ICD-10-CM

## 2017-12-05 DIAGNOSIS — I10 ESSENTIAL HYPERTENSION: ICD-10-CM

## 2017-12-05 DIAGNOSIS — R60.0 BILATERAL LEG EDEMA: ICD-10-CM

## 2017-12-05 PROCEDURE — 99214 OFFICE O/P EST MOD 30 MIN: CPT | Performed by: NURSE PRACTITIONER

## 2017-12-05 RX ORDER — ATORVASTATIN CALCIUM 10 MG/1
10 TABLET, FILM COATED ORAL DAILY
COMMUNITY
End: 2017-12-05 | Stop reason: CLARIF

## 2017-12-05 RX ORDER — DIAZEPAM 10 MG/1
10 TABLET ORAL 2 TIMES DAILY
COMMUNITY
End: 2017-12-05 | Stop reason: CLARIF

## 2017-12-05 RX ORDER — ASPIRIN 81 MG/1
81 TABLET ORAL DAILY
COMMUNITY
End: 2017-12-05 | Stop reason: CLARIF

## 2017-12-05 NOTE — PATIENT INSTRUCTIONS
fluid builds up in your lungs, and you have problems breathing. You might need to go to the hospital. By watching for changes in your condition and avoiding triggers, you can prevent heart failure flare-ups. Follow-up care is a key part of your treatment and safety. Be sure to make and go to all appointments, and call your doctor if you are having problems. It's also a good idea to know your test results and keep a list of the medicines you take. How can you care for yourself at home? Watch for changes in your weight and condition  · Weigh yourself without clothing at the same time each day. Record your weight. Call your doctor if you have sudden weight gain, such as more than 2 to 3 pounds in a day or 5 pounds in a week. (Your doctor may suggest a different range of weight gain.) A sudden weight gain may mean that your heart failure is getting worse. · Keep a daily record of your symptoms. Write down any changes in how you feel, such as new shortness of breath, cough, or problems eating. Also record if your ankles are more swollen than usual and if you feel more tired than usual. Note anything that you ate or did that could have triggered these changes. Limit sodium  Sodium causes your body to hold on to extra water. This may cause your heart failure symptoms to get worse. People get most of their sodium from processed foods. Fast food and restaurant meals also tend to be very high in sodium. · Your doctor may suggest that you limit sodium to 2,000 milligrams (mg) a day or less. That is less than 1 teaspoon of salt a day, including all the salt you eat in cooking or in packaged foods. · Read food labels on cans and food packages. They tell you how much sodium you get in one serving. Check the serving size. If you eat more than one serving, you are getting more sodium.   · Be aware that sodium can come in forms other than salt, including monosodium glutamate (MSG), sodium citrate, and sodium bicarbonate (baking soda). MSG is often added to Asian food. You can sometimes ask for food without MSG or salt. · Slowly reducing salt will help you adjust to the taste. Take the salt shaker off the table. · Flavor your food with garlic, lemon juice, onion, vinegar, herbs, and spices instead of salt. Do not use soy sauce, steak sauce, onion salt, garlic salt, mustard, or ketchup on your food, unless it is labeled \"low-sodium\" or \"low-salt. \"  · Make your own salad dressings, sauces, and ketchup without adding salt. · Use fresh or frozen ingredients, instead of canned ones, whenever you can. Choose low-sodium canned goods. · Eat less processed food and food from restaurants, including fast food. Exercise as directed  Moderate, regular exercise is very good for your heart. It improves your blood flow and helps control your weight. But too much exercise can stress your heart and cause a heart failure flare-up. · Check with your doctor before you start an exercise program.  · Walking is an easy way to get exercise. Start out slowly. Gradually increase the length and pace of your walk. Swimming, riding a bike, and using a treadmill are also good forms of exercise. · When you exercise, watch for signs that your heart is working too hard. You are pushing yourself too hard if you cannot talk while you are exercising. If you become short of breath or dizzy or have chest pain, stop, sit down, and rest.  · Do not exercise when you do not feel well. Take medicines correctly  · Take your medicines exactly as prescribed. Call your doctor if you think you are having a problem with your medicine. · Make a list of all the medicines you take. Include those prescribed to you by other doctors and any over-the-counter medicines, vitamins, or supplements you take. Take this list with you when you go to any doctor. · Take your medicines at the same time every day.  It may help you to post a list of all the medicines you take every day and what time of day you take them. · Make taking your medicine as simple as you can. Plan times to take your medicines when you are doing other things, such as eating a meal or getting ready for bed. This will make it easier to remember to take your medicines. · Get organized. Use helpful tools, such as daily or weekly pill containers. When should you call for help? Call 911 if you have symptoms of sudden heart failure such as:  · You have severe trouble breathing. · You cough up pink, foamy mucus. · You have a new irregular or rapid heartbeat. Call your doctor now or seek immediate medical care if:  · You have new or increased shortness of breath. · You are dizzy or lightheaded, or you feel like you may faint. · You have sudden weight gain, such as more than 2 to 3 pounds in a day or 5 pounds in a week. (Your doctor may suggest a different range of weight gain.)  · You have increased swelling in your legs, ankles, or feet. · You are suddenly so tired or weak that you cannot do your usual activities. Watch closely for changes in your health, and be sure to contact your doctor if you develop new symptoms. Where can you learn more? Go to https://[employer]. org and sign in to your [employer] account. Enter K445 in the [facility] box to learn more about \"Avoiding Triggers With Heart Failure: Care Instructions. \"     If you do not have an account, please click on the \"Sign Up Now\" link. Current as of: February 23, 2017  Content Version: 11.3  © 7031-9098 [employer]. Care instructions adapted under license by [facility] ([facility]). If you have questions about a medical condition or this instruction, always ask your healthcare professional. [place] [address] any warranty or liability for your use of this information.

## 2017-12-05 NOTE — PROGRESS NOTES
Active Problem List   Diagnosis Code    Lymphedema of both lower extremities I89.0    Morbid obesity due to excess calories (HCC) E66.01    Cellulitis of left lower extremity L03. 116    Persistent atrial fibrillation (HCC) I48.1    History of cerebellar hemorrhage Z86.79    Hypophosphatemia E83.39    Morbid obesity (HCC) E66.01    Bilateral leg edema R60.0    Elevated brain natriuretic peptide (BNP) level R79.89    History of snoring Z87.898    Essential hypertension I10    Chronic anticoagulation Z79.01     Past Medical History:   Diagnosis Date    Anxiety     Cerebral hemorrhage (HCC)     CHF (congestive heart failure) (HCC)     Hypertension      Past Surgical History:   Procedure Laterality Date    TONSILLECTOMY       No family history on file. Social History   Substance Use Topics    Smoking status: Never Smoker    Smokeless tobacco: Never Used    Alcohol use Yes      Comment: on occasion      Current Outpatient Prescriptions   Medication Sig Dispense Refill    rivaroxaban (XARELTO) 15 MG TABS tablet Take 15 mg by mouth      aspirin 81 MG EC tablet Take 81 mg by mouth daily      diazepam (VALIUM) 10 MG tablet Take 10 mg by mouth 2 times daily .  atorvastatin (LIPITOR) 10 MG tablet Take 10 mg by mouth daily      metoprolol tartrate (LOPRESSOR) 100 MG tablet Take 1 tablet by mouth 2 times daily 60 tablet 0    finasteride (PROSCAR) 5 MG tablet Take 1 tablet by mouth daily 30 tablet 0    bumetanide (BUMEX) 2 MG tablet Take 0.5 tablets by mouth daily 30 tablet 0    Phenytoin (DILANTIN PO) Take 200 mg by mouth 3 times daily       potassium chloride (KLOR-CON M) 10 MEQ extended release tablet Take 10 mEq by mouth daily       No current facility-administered medications for this visit. Allergies: Sulfa antibiotics    Review of Systems  Constitutional  no appetite change, or unexpected weight change. No fever, chills or diaphoresis.   No significant change in activity level or new onset of fatigue. Morbid obesity. HEENT  no significant rhinorrhea or epistaxis. No tinnitus or significant hearing loss. Eyes  no sudden vision change or amaurosis. No corneal arcus, xantholasma, subconjunctival hemorrhage or discharge. Respiratory  no significant wheezing, stridor, apnea or cough. No dyspnea on exertion or shortness of air. Cardiovascular  no exertional chest pain to suggest myocardial ischemia. No orthopnea or PND. No sensation of sustained arrythmia. No occurrence of slow heart rate. No palpitations. No claudication. Chronic bilateral lower extremity edema - hx lymphedema. Gastrointestinal  no abdominal swelling or pain. No blood in stool. No severe constipation, diarrhea, nausea, or vomiting. Genitourinary  no dysuria, frequency, or urgency. No flank pain or hematuria. Musculoskeletal  no back pain or myalgia. Transported via wheelchair. Extremities - no clubbing or cyanosis. Skin  no color change or rash. No pallor. No new surgical incision. Neurologic  no speech difficulty, facial asymmetry or lateralizing weakness. No seizures, presyncope or syncope. No significant dizziness. Hematologic  no easy bruising or excessive bleeding. Psychiatric  no severe anxiety or insomnia. No confusion. All other review of systems are negative. Objective  Vital Signs - /82   Pulse 86   Ht 6' (1.829 m)   Wt (!) 443 lb (200.9 kg)   BMI 60.08 kg/m²   General - Marzettjose enrique Baez is alert, cooperative, and pleasant. Well groomed. No acute distress. Body habitus - Body mass index is 60.08 kg/m². HEENT  Head is normocephalic. No circumoral cyanosis. Dentition is normal.  EYES -   Lids normal without ptosis. No discharge, edema or subconjunctival hemorrhage. Neck - Symmetrical without apparent mass or lymphadenopathy. Respiratory - Normal respiratory effort without use of accessory muscles.   Ausculatation reveals vesicular breath sounds without crackles, wheezes, rub or rhonchi. Cardiovascular  No jugular venous distention. Auscultation reveals irregular irregular rate and rhythm. No audible clicks, gallop or rub. No murmur. No lower extremity varicosities. No carotid bruits. Abdominal -  No visible distention, mass or pulsations. Extremities - No clubbing or cyanosis. No statis dermatitis or ulcers. Significant firm non-pitting bilateral lower extremity edema. Patient reports much improved - can now wear shoes. Musculoskeletal -   No Osler's nodes. No kyphosis or scoliosis. Transported via wheelchair. Skin -  Warm and dry; no rash. No new surgical wound. Skin color pale. Neurological - No focal neurological deficits. Thought processes coherent. No apparent tremor. Oriented to person, place and time. Psychiatric -  Appropriate affect and mood. Assessment:    1. Persistent atrial fibrillation ([address]     2. Morbid obesity ([address]     3. Bilateral leg edema     4. Elevated brain natriuretic peptide (BNP) level      Reports Dr. Patricio Nagy recently checked BNP approximately 1000   5. Essential hypertension     6. History of snoring     7. Chronic anticoagulation      Xarelto 15 mg daily     Stable CV status without symptoms. Good rate control of persistent AF. Tolerating anticoagulation therapy without bleeding issues. Improvement in leg edema with current diuretic. No current PND or orthopnea. Patient planning for sleep studies on 12/19/17. Scheduled patient to follow up with Dr. Kj Ron in one month. Discussed AF treatment options to include anti-arrhythmic therapy and DCCV. Patient signed for release of records from PCP. Patient is compliant with medication regimen.     BP Readings from Last 3 Encounters:   12/05/17 124/82   11/02/17 119/71    Pulse Readings from Last 3 Encounters:   12/05/17 86   11/02/17 84        Wt Readings from Last 3 Encounters:   12/05/17 (!) 443 lb (200.9 kg)   11/02/17 (!) 459 lb 6 oz (208.4 kg)       Recent Results (from %    Basophils % 0.5 0.0 - 1.0 %    Neutrophils # 10.1 (H) 1.5 - 7.5 K/uL    Lymphocytes # 1.7 1.1 - 4.5 K/uL    Monocytes # 1.00 (H) 0.00 - 0.90 K/uL    Eosinophils # 0.10 0.00 - 0.60 K/uL    Basophils # 0.10 0.00 - 0.20 K/uL   Basic Metabolic Panel    Collection Time: 10/29/17  2:53 AM   Result Value Ref Range    Sodium 141 136 - 145 mmol/L    Potassium 3.7 3.5 - 5.0 mmol/L    Chloride 97 (L) 98 - 111 mmol/L    CO2 38 (H) 22 - 29 mmol/L    Anion Gap 6 (L) 7 - 19 mmol/L    Glucose 104 74 - 109 mg/dL    BUN 11 8 - 23 mg/dL    CREATININE 0.6 0.5 - 1.2 mg/dL    GFR Non-African American >60 >60    Calcium 7.9 (L) 8.8 - 10.2 mg/dL   CBC Auto Differential    Collection Time: 10/29/17  2:53 AM   Result Value Ref Range    WBC 14.0 (H) 4.8 - 10.8 K/uL    RBC 3.72 (L) 4.70 - 6.10 M/uL    Hemoglobin 11.7 (L) 14.0 - 18.0 g/dL    Hematocrit 37.0 (L) 42.0 - 52.0 %    MCV 99.5 (H) 80.0 - 94.0 fL    MCH 31.5 (H) 27.0 - 31.0 pg    MCHC 31.6 (L) 33.0 - 37.0 g/dL    RDW 14.5 11.5 - 14.5 %    Platelets 688 279 - 449 K/uL    MPV 9.3 (L) 9.4 - 12.4 fL    Neutrophils % 74.9 (H) 50.0 - 65.0 %    Lymphocytes % 14.6 (L) 20.0 - 40.0 %    Monocytes % 7.2 0.0 - 10.0 %    Eosinophils % 1.3 0.0 - 5.0 %    Basophils % 0.3 0.0 - 1.0 %    Neutrophils # 10.5 (H) 1.5 - 7.5 K/uL    Lymphocytes # 2.1 1.1 - 4.5 K/uL    Monocytes # 1.00 (H) 0.00 - 0.90 K/uL    Eosinophils # 0.20 0.00 - 0.60 K/uL    Basophils # 0.00 0.00 - 0.20 K/uL   Basic Metabolic Panel    Collection Time: 10/30/17  2:30 AM   Result Value Ref Range    Sodium 140 136 - 145 mmol/L    Potassium 4.3 3.5 - 5.0 mmol/L    Chloride 98 98 - 111 mmol/L    CO2 36 (H) 22 - 29 mmol/L    Anion Gap 6 (L) 7 - 19 mmol/L    Glucose 103 74 - 109 mg/dL    BUN 11 8 - 23 mg/dL    CREATININE 0.6 0.5 - 1.2 mg/dL    GFR Non-African American >60 >60    Calcium 8.0 (L) 8.8 - 10.2 mg/dL   CBC Auto Differential    Collection Time: 10/30/17  2:30 AM   Result Value Ref Range    WBC 13.6 (H) 4.8 - 10.8 K/uL    RBC 3.90 (L) 4.70 - 6.10 M/uL    Hemoglobin 12.2 (L) 14.0 - 18.0 g/dL    Hematocrit 39.0 (L) 42.0 - 52.0 %    .0 (H) 80.0 - 94.0 fL    MCH 31.3 (H) 27.0 - 31.0 pg    MCHC 31.3 (L) 33.0 - 37.0 g/dL    RDW 14.4 11.5 - 14.5 %    Platelets 194 935 - 718 K/uL    MPV 9.1 (L) 9.4 - 12.4 fL    Neutrophils % 73.9 (H) 50.0 - 65.0 %    Lymphocytes % 16.1 (L) 20.0 - 40.0 %    Monocytes % 7.1 0.0 - 10.0 %    Eosinophils % 1.2 0.0 - 5.0 %    Basophils % 0.4 0.0 - 1.0 %    Neutrophils # 10.1 (H) 1.5 - 7.5 K/uL    Lymphocytes # 2.2 1.1 - 4.5 K/uL    Monocytes # 1.00 (H) 0.00 - 0.90 K/uL    Eosinophils # 0.20 0.00 - 0.60 K/uL    Basophils # 0.10 0.00 - 0.20 K/uL   Basic Metabolic Panel    Collection Time: 10/31/17  3:44 AM   Result Value Ref Range    Sodium 139 136 - 145 mmol/L    Potassium 3.7 3.5 - 5.0 mmol/L    Chloride 99 98 - 111 mmol/L    CO2 32 (H) 22 - 29 mmol/L    Anion Gap 8 7 - 19 mmol/L    Glucose 102 74 - 109 mg/dL    BUN 14 8 - 23 mg/dL    CREATININE 0.5 0.5 - 1.2 mg/dL    GFR Non-African American >60 >60    Calcium 7.9 (L) 8.8 - 10.2 mg/dL   CBC Auto Differential    Collection Time: 10/31/17  3:44 AM   Result Value Ref Range    WBC 12.6 (H) 4.8 - 10.8 K/uL    RBC 3.65 (L) 4.70 - 6.10 M/uL    Hemoglobin 11.5 (L) 14.0 - 18.0 g/dL    Hematocrit 36.5 (L) 42.0 - 52.0 %    .0 (H) 80.0 - 94.0 fL    MCH 31.5 (H) 27.0 - 31.0 pg    MCHC 31.5 (L) 33.0 - 37.0 g/dL    RDW 14.2 11.5 - 14.5 %    Platelets 957 (H) 072 - 400 K/uL    MPV 9.5 9.4 - 12.4 fL    Neutrophils % 73.6 (H) 50.0 - 65.0 %    Lymphocytes % 17.3 (L) 20.0 - 40.0 %    Monocytes % 6.7 0.0 - 10.0 %    Eosinophils % 1.0 0.0 - 5.0 %    Basophils % 0.4 0.0 - 1.0 %    Neutrophils # 9.3 (H) 1.5 - 7.5 K/uL    Lymphocytes # 2.2 1.1 - 4.5 K/uL    Monocytes # 0.80 0.00 - 0.90 K/uL    Eosinophils # 0.10 0.00 - 0.60 K/uL    Basophils # 0.10 0.00 - 0.20 K/uL   CBC    Collection Time: 11/01/17  3:27 AM   Result Value Ref Range    WBC 12.3 (H) 4.8 - 10.8 K/uL    RBC 3.79 (L) 4.70 - 6.10 M/uL    Hemoglobin 12.0 (L) 14.0 - 18.0 g/dL    Hematocrit 37.5 (L) 42.0 - 52.0 %    MCV 98.9 (H) 80.0 - 94.0 fL    MCH 31.7 (H) 27.0 - 31.0 pg    MCHC 32.0 (L) 33.0 - 37.0 g/dL    RDW 14.3 11.5 - 14.5 %    Platelets 214 (H) 347 - 400 K/uL    MPV 8.9 (L) 9.4 - 12.4 fL   Basic Metabolic Panel    Collection Time: 11/01/17  3:27 AM   Result Value Ref Range    Sodium 141 136 - 145 mmol/L    Potassium 3.7 3.5 - 5.0 mmol/L    Chloride 98 98 - 111 mmol/L    CO2 34 (H) 22 - 29 mmol/L    Anion Gap 9 7 - 19 mmol/L    Glucose 106 74 - 109 mg/dL    BUN 14 8 - 23 mg/dL    CREATININE 0.6 0.5 - 1.2 mg/dL    GFR Non-African American >60 >60    Calcium 8.2 (L) 8.8 - 10.2 mg/dL   EKG 12 Lead    Collection Time: 11/01/17 11:27 AM   Result Value Ref Range    P-R Interval  ms    QRS Duration 80 ms    Q-T Interval 390 ms    QTc Calculation (Bazett) 430 ms    P Axis  degrees    T Axis 45 degrees   Phenytoin Level, Total    Collection Time: 11/02/17  1:24 PM   Result Value Ref Range    Phenytoin Lvl 5.5 (L) 10.0 - 20.0 ug/mL     Plan  Previous cardiac history and records reviewed. Continue current medications as prescribed. Continue to follow up with primary care provider for non cardiac medical problems. Call the office with any problems, questions or concerns at [phone]. Follow up as scheduled with your cardiologist- Dr. Malorie Campos in one month. The following educational material has been included in this after visit summary for your review: atrial fibrillation, cardioversion, heart failure and Xarelto.     Additional instructions:  Xarelto can increase your risk of bleeding. If you notice blood in urine or stool, bleeding gums, excessive bruising or cough productive of bloody sputum, notify the office. Information on this blood thinner has been included in your after visit summary. *Weigh yourself without clothing at the same time each day. Record your weight.  Call the office if you gain 3 pounds or more in 2 to 3 days or 5 pounds in one week. A sudden weight gain may mean that your heart failure is getting worse. *Sodium causes your body to hold on to extra water. This may cause your heart failure symptoms to get worse. Limit sodium to 2,000 milligrams (mg) a day or less. That is less than 1 teaspoon of salt a day, including all the salt you eat in cooking or in packaged foods. Coronary artery disease risk factors you can control: Smoking, high blood pressure, high cholesterol, diabetes, being overweight, lack of exercise and stress. Continue heart healthy diet. Take medications as directed. Exercise as tolerated. Strive for 15 minutes of exercise most days of the week. If asked to keep a blood pressure log, do so for 2 weeks. Call the office to report readings at [phone]. Blood pressure goal is 140/90 or less. If you are a diabetic, the goal is 130/80 or less. If you are taking cholesterol lowering medications, it is recommended that lab work be checked annually. Always keep a current medication list. Bring your medications to every office visit.     FIDEL Piña

## 2018-01-03 ENCOUNTER — TELEPHONE (OUTPATIENT)
Dept: CARDIOLOGY | Age: 73
End: 2018-01-03

## 2018-01-08 ENCOUNTER — OFFICE VISIT (OUTPATIENT)
Dept: CARDIOLOGY | Age: 73
End: 2018-01-08

## 2018-01-08 VITALS
SYSTOLIC BLOOD PRESSURE: 110 MMHG | DIASTOLIC BLOOD PRESSURE: 74 MMHG | HEART RATE: 50 BPM | HEIGHT: 72 IN | WEIGHT: 315 LBS | BODY MASS INDEX: 42.66 KG/M2

## 2018-01-08 DIAGNOSIS — I10 ESSENTIAL HYPERTENSION: ICD-10-CM

## 2018-01-08 DIAGNOSIS — Z79.01 CHRONIC ANTICOAGULATION: ICD-10-CM

## 2018-01-08 DIAGNOSIS — I48.19 PERSISTENT ATRIAL FIBRILLATION (HCC): Primary | Chronic | ICD-10-CM

## 2018-01-08 PROCEDURE — 93000 ELECTROCARDIOGRAM COMPLETE: CPT | Performed by: NURSE PRACTITIONER

## 2018-01-08 PROCEDURE — 99213 OFFICE O/P EST LOW 20 MIN: CPT | Performed by: NURSE PRACTITIONER

## 2018-01-08 RX ORDER — METOPROLOL TARTRATE 100 MG/1
100 TABLET ORAL 2 TIMES DAILY
Qty: 60 TABLET | Refills: 0 | Status: SHIPPED | OUTPATIENT
Start: 2018-01-08

## 2018-01-08 NOTE — PATIENT INSTRUCTIONS
Continue current medications as prescribed. Continue to follow up with primary care provider for non cardiac medical problems. Call the office with any problems, questions or concerns at [phone]. Follow up as scheduled with your cardiologist- one month Dr. Tolu Sherwood. The following educational material has been included in this after visit summary for your review: Xarelto, atrial fibrillation and cardioversion.     Additional instructions:  Xarelto can increase your risk of bleeding. If you notice blood in urine or stool, bleeding gums, excessive bruising or cough productive of bloody sputum, notify the office. Information on this blood thinner has been included in your after visit summary. Coronary artery disease risk factors you can control: Smoking, high blood pressure, high cholesterol, diabetes, being overweight, lack of exercise and stress. Continue heart healthy diet. Take medications as directed. Exercise as tolerated. Strive for 15 minutes of exercise most days of the week. If asked to keep a blood pressure log, do so for 2 weeks. Call the office to report readings at [phone]. Blood pressure goal is 140/90 or less. If you are a diabetic, the goal is 130/80 or less. If you are taking cholesterol lowering medications, it is recommended that lab work be checked annually. Always keep a current medication list. Bring your medications to every office visit.        Patient Education      Learning About Atrial Fibrillation  What is atrial fibrillation? Atrial fibrillation (say \"AY-tree-wilber xpp-hrvq-NPW-shun\") is the most common type of irregular heartbeat (arrhythmia). Normally, the heart beats in a strong, steady rhythm. In atrial fibrillation, a problem with the heart's electrical system causes the two upper parts of the heart (the atria) to quiver, or fibrillate.  Your heart rate also may be faster than normal.  Atrial fibrillation can be dangerous because if the heartbeat isn't strong symptoms. · Heart rhythm treatment. Different treatments may be used to try to stop atrial fibrillation and keep it from returning. They can also relieve symptoms. These treatments include medicine, electrical cardioversion to shock the heart back to a normal rhythm, a procedure called catheter ablation, and heart surgery. · Stroke prevention. You and your doctor can decide how to lower your risk. You may decide to take a blood-thinning medicine, such as aspirin or an anticoagulant. How can you live well with it? You can live well and help manage atrial fibrillation by having a heart-healthy lifestyle. To have a heart-healthy lifestyle:  · Don't smoke. · Eat heart-healthy foods. · Be active. Talk to your doctor about what type and level of exercise is safe for you. · Stay at a healthy weight. Lose weight if you need to. · Manage stress. · Avoid alcohol if it triggers symptoms. · Manage other health problems such as high blood pressure, high cholesterol, and diabetes. · Avoid getting sick from the flu. Get a flu shot every year. Where can you learn more? Go to [email or web address] org and sign in to your [employer] account. Enter N028 in the [facility] box to learn more about \"Learning About Atrial Fibrillation. \"     If you do not have an account, please click on the \"Sign Up Now\" link. Current as of: September 21, 2016  Content Version: 11.5  © 4631-6462 [employer]. Care instructions adapted under license by [facility] ([facility]). If you have questions about a medical condition or this instruction, always ask your healthcare professional. [place] [address] any warranty or liability for your use of this information. Patient Education        Learning About Cardioversion  What is cardioversion? Cardioversion helps your heart return to a normal rhythm. It treats problems like atrial fibrillation. It is also sometimes used in emergencies.  It can embolism). A DVT can occur after certain types of surgery. Rivaroxaban is also used in people with atrial fibrillation (a heart rhythm disorder) to lower the risk of stroke caused by a blood clot. Rivaroxaban may also be used for purposes not listed in this medication guide. What should I discuss with my healthcare provider before taking rivaroxaban? You should not use this medication if you are allergic to rivaroxaban, or if you have:  · an artificial heart valve; or  · active or uncontrolled bleeding. Rivaroxaban can cause a very serious blood clot around your spinal cord if you undergo a spinal tap or receive spinal anesthesia (epidural). This type of blood clot could cause long-term paralysis, and may be more likely to occur if:   · you have a genetic spinal defect;  · you have a spinal catheter in place;  · you have a history of spinal surgery or repeated spinal taps;  · you have recently had a spinal tap or epidural anesthesia;  · you are taking an NSAID--Advil, Aleve, Motrin, and others; or  · you are using other medicines to treat or prevent blood clots. Rivaroxaban may cause you to bleed more easily, especially if you have:  · a bleeding disorder that is inherited or caused by disease;  · hemorrhagic stroke;  · uncontrolled high blood pressure;  · stomach or intestinal bleeding or ulcer; or  · if you take certain medicines such as aspirin, enoxaparin, heparin, warfarin (Coumadin, Rip Mu), clopidogrel (Plavix), or certain antidepressants. To make sure you can safely take rivaroxaban, tell your doctor if you have kidney or liver disease. It is not known whether this medicine will harm an unborn baby. Tell your doctor if you are pregnant or plan to become pregnant. It is not known whether rivaroxaban passes into breast milk or if it could harm a nursing baby. You should not breast-feed while you are using rivaroxaban. How should I take rivaroxaban?   Rivaroxaban is taken either once per day or two times per day, depending on the reason you are using this medication. Follow all directions on your prescription label. Your doctor may occasionally change your dose. Do not take this medicine in larger or smaller amounts or for longer than recommended. Carefully follow your doctor's dosing instructions. For atrial fibrillation: Rivaroxaban is usually taken once per day with your evening meal.  For blood clots in your legs or lungs: Rivaroxaban is usually taken with food 1 or 2 times per day, at the same time each day. For hip or knee replacement surgery: Rivaroxaban is usually taken once per day with or without food. Tell your doctor if you have trouble swallowing a rivaroxaban tablet. Tell any doctor who treats you that you are using rivaroxaban. If you need surgery or dental work, tell the surgeon or dentist ahead of time that you are using this medication. If you need anesthesia for a medical procedure or surgery, you may need to stop using rivaroxaban for a short time. Use rivaroxaban regularly to get the most benefit. Get your prescription refilled before you run out of medicine completely. Do not change your dose or stop taking this medication without first talking to your doctor. Stopping suddenly can increase your risk of blood clot or stroke. Store at room temperature away from moisture and heat. What happens if I miss a dose? If you take rivaroxaban 1 time each day: Take the missed dose as soon as you remember. Take your next dose the following day and stay on your once-daily schedule. Do not take extra medicine to make up the missed dose. If you take rivaroxaban 2 times each day: Take the missed dose as soon as you remember. You may take 2 doses at the same time to make up a missed dose. What happens if I overdose? Seek emergency medical attention or call the Poison Help line at 1-510.248.4949. Overdose may cause excessive bleeding. What should I avoid while taking rivaroxaban?   Avoid about rivaroxaban. Remember, keep this and all other medicines out of the reach of children, never share your medicines with others, and use this medication only for the indication prescribed. Every effort has been made to ensure that the information provided by [address] is accurate, up-to-date, and complete, but no guarantee is made to that effect. Drug information contained herein may be time sensitive. [facility] information has been compiled for use by healthcare practitioners and consumers in the [place] and therefore [facility] does not warrant that uses outside of the [place] are appropriate, unless specifically indicated otherwise. [facility]'s drug information does not endorse drugs, diagnose patients or recommend therapy. [facility]'s drug information is an informational resource designed to assist licensed healthcare practitioners in caring for their patients and/or to serve consumers viewing this service as a supplement to, and not a substitute for, the expertise, skill, knowledge and judgment of healthcare practitioners. The absence of a warning for a given drug or drug combination in no way should be construed to indicate that the drug or drug combination is safe, effective or appropriate for any given patient. [facility] does not assume any responsibility for any aspect of healthcare administered with the aid of information [facility] provides. The information contained herein is not intended to cover all possible uses, directions, precautions, warnings, drug interactions, allergic reactions, or adverse effects. If you have questions about the drugs you are taking, check with your doctor, nurse or pharmacist.  Copyright 3693-6945 [address]. Version: 3.01. Revision date: 4/13/2017. Care instructions adapted under license by [facility] ([facility]).  If you have questions about a medical condition or this instruction, always ask your healthcare professional. Robel Donato

## 2018-01-08 NOTE — PROGRESS NOTES
level R79.89    History of snoring Z87.898    Essential hypertension I10    Chronic anticoagulation Z79.01     Past Medical History:   Diagnosis Date    Anxiety     Cerebral hemorrhage (HCC)     CHF (congestive heart failure) (HCC)     Hypertension      Past Surgical History:   Procedure Laterality Date    TONSILLECTOMY       History reviewed. No pertinent family history. Social History   Substance Use Topics    Smoking status: Never Smoker    Smokeless tobacco: Never Used    Alcohol use Yes      Comment: on occasion      Current Outpatient Prescriptions   Medication Sig Dispense Refill    rivaroxaban (XARELTO) 20 MG TABS tablet Take 20 mg by mouth daily (with breakfast)      metoprolol (LOPRESSOR) 100 MG tablet Take 1 tablet by mouth 2 times daily 60 tablet 0    finasteride (PROSCAR) 5 MG tablet Take 1 tablet by mouth daily 30 tablet 0    bumetanide (BUMEX) 2 MG tablet Take 0.5 tablets by mouth daily 30 tablet 0    Phenytoin (DILANTIN PO) Take 200 mg by mouth 3 times daily       potassium chloride (KLOR-CON M) 10 MEQ extended release tablet Take 10 mEq by mouth daily       No current facility-administered medications for this visit. Allergies: Sulfa antibiotics    Review of Systems  Constitutional  no appetite change, or unexpected weight change. No fever, chills or diaphoresis. No significant change in activity level or new onset of fatigue. HEENT  no significant rhinorrhea or epistaxis. No tinnitus or significant hearing loss. Eyes  no sudden vision change or amaurosis. No corneal arcus, xantholasma, subconjunctival hemorrhage or discharge. Respiratory  no significant wheezing, stridor, apnea or cough. No dyspnea on exertion or shortness of air. Cardiovascular  no exertional chest pain to suggest myocardial ischemia. No orthopnea or PND. No occurrence of slow heart rate. No palpitations. No claudication. + chronic bilateral lower extremity edema - improved.  + continued arrhythmia. Gastrointestinal  no abdominal swelling or pain. No blood in stool. No severe constipation, diarrhea, nausea, or vomiting. Genitourinary  no dysuria, frequency, or urgency. No flank pain or hematuria. Musculoskeletal  no back pain or myalgia. No problems with gait. Extremities - no clubbing, cyanosis or edema. Skin  no color change or rash. No pallor. No new surgical incision. Neurologic  no speech difficulty, facial asymmetry or lateralizing weakness. No seizures, presyncope or syncope. No significant dizziness. Hematologic  no easy bruising or excessive bleeding. Psychiatric  no severe anxiety or insomnia. No confusion. All other review of systems are negative. Objective  Vital Signs - /74   Pulse 50   Ht 6' (1.829 m)   Wt (!) 409 lb (185.5 kg)   BMI 55.47 kg/m²   General - Srinivasan Lewis is alert, cooperative, and pleasant. Well groomed. No acute distress. Body habitus - Body mass index is 55.47 kg/m². HEENT  Head is normocephalic. No circumoral cyanosis. Dentition is normal.  EYES -   Lids normal without ptosis. No discharge, edema or subconjunctival hemorrhage. Neck - Symmetrical without apparent mass or lymphadenopathy. Respiratory - Normal respiratory effort without use of accessory muscles. Ausculatation reveals vesicular breath sounds without crackles, wheezes, rub or rhonchi. Cardiovascular  No jugular venous distention. Auscultation reveals irregularly irregular rate and rhythm. No audible clicks, gallop or rub. No murmur. No lower extremity varicosities. No carotid bruits. Abdominal -  No visible distention, mass or pulsations. Extremities - No clubbing or cyanosis. No statis dermatitis or ulcers. Bilateral marked non pitting lower extremity edema. Lichenification of skin. No marked erythema or weeping. Musculoskeletal -   No Osler's nodes. No kyphosis or scoliosis. Gait is even and regular without limp or shuffle.  Ambulates

## 2018-01-10 ENCOUNTER — TELEPHONE (OUTPATIENT)
Dept: CARDIOLOGY | Age: 73
End: 2018-01-10

## 2018-01-10 NOTE — TELEPHONE ENCOUNTER
Spoke with patient and told him that Dr. Deisy Godinez would be agreeable to trying cardioversion without him having a sleep study done but if he has significant sleep apnea not treated then we may not be successful in getting him in rhythm and holding him there. He will talk to his wife and get back with me on what he wants to do.

## 2018-02-19 ENCOUNTER — OFFICE VISIT (OUTPATIENT)
Dept: CARDIOLOGY | Age: 73
End: 2018-02-19

## 2018-02-19 ENCOUNTER — TELEPHONE (OUTPATIENT)
Dept: CARDIOLOGY | Age: 73
End: 2018-02-19

## 2018-02-19 VITALS
HEART RATE: 88 BPM | BODY MASS INDEX: 42.66 KG/M2 | HEIGHT: 72 IN | WEIGHT: 315 LBS | SYSTOLIC BLOOD PRESSURE: 100 MMHG | DIASTOLIC BLOOD PRESSURE: 60 MMHG

## 2018-02-19 DIAGNOSIS — I10 ESSENTIAL HYPERTENSION: ICD-10-CM

## 2018-02-19 DIAGNOSIS — R60.9 PERIPHERAL EDEMA: Primary | ICD-10-CM

## 2018-02-19 DIAGNOSIS — I48.19 PERSISTENT ATRIAL FIBRILLATION (HCC): Chronic | ICD-10-CM

## 2018-02-19 DIAGNOSIS — R60.0 BILATERAL LEG EDEMA: ICD-10-CM

## 2018-02-19 PROCEDURE — 99213 OFFICE O/P EST LOW 20 MIN: CPT | Performed by: INTERNAL MEDICINE

## 2018-02-19 RX ORDER — BUMETANIDE 2 MG/1
2 TABLET ORAL DAILY
Qty: 30 TABLET | Refills: 3 | Status: SHIPPED | OUTPATIENT
Start: 2018-02-19 | End: 2018-07-07 | Stop reason: SDUPTHER

## 2018-02-19 RX ORDER — POTASSIUM CHLORIDE 20 MEQ/1
20 TABLET, EXTENDED RELEASE ORAL DAILY
Qty: 30 TABLET | Refills: 5 | Status: SHIPPED | OUTPATIENT
Start: 2018-02-19 | End: 2018-09-29 | Stop reason: SDUPTHER

## 2018-03-28 ENCOUNTER — TELEPHONE (OUTPATIENT)
Dept: CARDIOLOGY | Age: 73
End: 2018-03-28

## 2018-07-07 DIAGNOSIS — R60.0 BILATERAL LEG EDEMA: ICD-10-CM

## 2018-07-07 DIAGNOSIS — I48.19 PERSISTENT ATRIAL FIBRILLATION (HCC): Chronic | ICD-10-CM

## 2018-07-09 RX ORDER — BUMETANIDE 2 MG/1
TABLET ORAL
Qty: 90 TABLET | Refills: 3 | Status: SHIPPED | OUTPATIENT
Start: 2018-07-09 | End: 2019-06-20 | Stop reason: SDUPTHER

## 2018-08-20 ENCOUNTER — TELEPHONE (OUTPATIENT)
Dept: CARDIOLOGY | Age: 73
End: 2018-08-20

## 2018-08-20 NOTE — TELEPHONE ENCOUNTER
unable to leave msg on pt phone in regards to appt being missed.  Will be sending letter to pt on missed appt

## 2018-09-29 DIAGNOSIS — I48.19 PERSISTENT ATRIAL FIBRILLATION (HCC): Chronic | ICD-10-CM

## 2018-09-29 DIAGNOSIS — R60.0 BILATERAL LEG EDEMA: ICD-10-CM

## 2018-10-01 RX ORDER — POTASSIUM CHLORIDE 1500 MG/1
TABLET, EXTENDED RELEASE ORAL
Qty: 90 TABLET | Refills: 3 | Status: SHIPPED | OUTPATIENT
Start: 2018-10-01

## 2018-12-20 ENCOUNTER — TELEPHONE (OUTPATIENT)
Dept: CARDIOLOGY | Age: 73
End: 2018-12-20

## 2018-12-20 NOTE — TELEPHONE ENCOUNTER
LVM; Left vm for pt to contact the office; Dr. Neil Lynn is out of the office on medical leave; ok to r/s with any of the NPs; 1st attempt

## 2018-12-21 ENCOUNTER — TELEPHONE (OUTPATIENT)
Dept: CARDIOLOGY | Age: 73
End: 2018-12-21

## 2018-12-26 ENCOUNTER — TELEPHONE (OUTPATIENT)
Dept: CARDIOLOGY | Age: 73
End: 2018-12-26

## 2019-06-17 DIAGNOSIS — R60.0 BILATERAL LEG EDEMA: ICD-10-CM

## 2019-06-17 DIAGNOSIS — I48.19 PERSISTENT ATRIAL FIBRILLATION (HCC): Chronic | ICD-10-CM

## 2019-06-20 DIAGNOSIS — R60.0 BILATERAL LEG EDEMA: ICD-10-CM

## 2019-06-20 DIAGNOSIS — I48.19 PERSISTENT ATRIAL FIBRILLATION (HCC): Chronic | ICD-10-CM

## 2019-06-20 RX ORDER — BUMETANIDE 2 MG/1
TABLET ORAL
Qty: 90 TABLET | Refills: 3 | Status: SHIPPED | OUTPATIENT
Start: 2019-06-20 | End: 2020-03-26

## 2020-03-26 RX ORDER — BUMETANIDE 2 MG/1
TABLET ORAL
Qty: 90 TABLET | Refills: 0 | Status: SHIPPED | OUTPATIENT
Start: 2020-03-26

## 2020-05-11 RX ORDER — BUMETANIDE 2 MG/1
TABLET ORAL
Qty: 90 TABLET | Refills: 0 | OUTPATIENT
Start: 2020-05-11

## 2020-05-13 RX ORDER — BUMETANIDE 2 MG/1
TABLET ORAL
Qty: 90 TABLET | Refills: 0 | OUTPATIENT
Start: 2020-05-13

## 2021-09-30 NOTE — PROGRESS NOTES
palpitations. Denies presyncope/syncope. Denies orthopnea/PND. Denies lower extremity edema. Gastrointestinal: Denies abdominal pain. Denies nausea/vomiting. Denies change in bowel habits or history of recent GI tract blood loss. Genitourinary: Denies urinary urgency or frequency. Denies dysuria, hematuria. Musculoskeletal: Denies pain or swelling in joints. Neurological: Denies paresthesias. Denies headache. Denies seizure or stroke symptoms. Behavioral/Psych: Denies problems with anxiety or depression. All other ROS negative except where stated above. Dietary Nutrition Supplements: Low Calorie High Protein Supplement  DIET CARDIAC; Daily Fluid Restriction: 1800 ml    Intake/Output Summary (Last 24 hours) at 10/31/17 1406  Last data filed at 10/31/17 0906   Gross per 24 hour   Intake          2975.97 ml   Output             1600 ml   Net          1375.97 ml     Medications:   Current Facility-Administered Medications   Medication Dose Route Frequency Provider Last Rate Last Dose    oxyCODONE-acetaminophen (PERCOCET) 7.5-325 MG per tablet 1 tablet  1 tablet Oral Q6H PRN Chong D Batsheva, DO   1 tablet at 10/31/17 0819    calcium carbonate (TUMS) chewable tablet 500 mg  500 mg Oral TID PRN Chong D Batsheva, DO   500 mg at 10/31/17 0841    pantoprazole (PROTONIX) tablet 40 mg  40 mg Oral QAM AC Chong D Batsheva, DO   40 mg at 10/31/17 0511    HYDROCERIN cream CREA   Topical BID Chong D Batsheva, DO        doxycycline hyclate (VIBRAMYCIN) capsule 100 mg  100 mg Oral 2 times per day Chogn D Batsheva, DO   100 mg at 10/31/17 0813    amoxicillin-clavulanate (AUGMENTIN) 875-125 MG per tablet 1 tablet  1 tablet Oral 2 times per day Chong D Batsheva, DO   1 tablet at 10/31/17 0813    metoprolol tartrate (LOPRESSOR) tablet 100 mg  100 mg Oral BID BRIELLE Alston MD   100 mg at 10/31/17 0813    bumetanide (BUMEX) injection 1 mg  1 mg Intravenous BID Chong D Batsheva, DO   1 mg at 10/31/17 0813    finasteride (PROSCAR) tablet 5 mg  5 oxacillin Sensitive <=0.25 mcg/mL   tetracycline Sensitive <=1 mcg/mL   trimethoprim-sulfamethoxazole Sensitive <=10 mcg/mL   vancomycin Sensitive 1 mcg/mL           Objective:   Vitals: /76   Pulse 84   Temp 97.4 °F (36.3 °C) (Temporal)   Resp 22   Ht 5' 11\" (1.803 m)   Wt (!) 449 lb 7 oz (203.9 kg)   SpO2 94%   BMI 62.68 kg/m²   24HR INTAKE/OUTPUT:    Intake/Output Summary (Last 24 hours) at 10/31/17 1406  Last data filed at 10/31/17 0906   Gross per 24 hour   Intake          2975.97 ml   Output             1600 ml   Net          1375.97 ml     General appearance: alert and cooperative with exam  HEENT: atraumatic, eyes with clear conjunctiva and normal lids, pupils and irises normal, external ears normal, lips normal.  Neck without masses, lympadenopathy, bruit, thyroid normal  Lungs: no increased work of breathing, clear to auscultation bilaterally  Heart: normal HT with irregular rate and rhythm, no murmurs, gallops or rubs  Abdomen: large, soft with active bowel sounds, no masses or tenderness  Extremities: no clubbing or cyanosis - ACE wrap to lower extremities. 2+ edema. Pedal pulses palpated. Unable to assess LLE wound due to ACE wrap. Neurologic: No focal neurologic deficits, normal sensation, alert and oriented, affect and mood appropriate. Skin: no rashes, nodules. Assessment and Plan:     Principal Problem:    Cellulitis of left lower extremity (Staphylococcus aureus) - on Augmentin, Doxycycline    Active Problems:    Persistent atrial fibrillation (HCC) - Rate controlled with BB. Anticoagulated on Pradaxa    Lymphedema of both lower extremities - ACE wraps. Diuretic    Morbid obesity due to excess calories ([address] - Noted. History of cerebellar hemorrhage - On prophylactic Dilantin    Suspect Obstructive Sleep Apnea versus Obesity Hypoventilation Syndrome - will check overnight pulse oximetry      Advance Directive: Full Code    DVT prophylaxis: Pradaxa    Discharge planning: TBD. Referral to [facility]. Check overnight pulse oximetry on RA.          Afshan Orosco, APRN oral

## 2025-06-26 ENCOUNTER — TELEPHONE (OUTPATIENT)
Dept: NEUROLOGY | Age: 80
End: 2025-06-26

## 2025-06-26 NOTE — TELEPHONE ENCOUNTER
1st attempt: Received a referral for this patient. Called patient and left a VM to call our office back to where we could get patient scheduled an appointment with Dr. Acosta.